# Patient Record
Sex: MALE | Race: WHITE | NOT HISPANIC OR LATINO | Employment: OTHER | RURAL
[De-identification: names, ages, dates, MRNs, and addresses within clinical notes are randomized per-mention and may not be internally consistent; named-entity substitution may affect disease eponyms.]

---

## 2017-03-16 ENCOUNTER — HISTORICAL (OUTPATIENT)
Dept: ADMINISTRATIVE | Facility: HOSPITAL | Age: 77
End: 2017-03-16

## 2017-03-17 LAB
LAB AP CLINICAL INFORMATION: NORMAL
LAB AP COMMENTS: NORMAL
LAB AP DIAGNOSIS - HISTORICAL: NORMAL
LAB AP GROSS PATHOLOGY - HISTORICAL: NORMAL
LAB AP SPECIMEN SUBMITTED - HISTORICAL: NORMAL

## 2017-11-21 ENCOUNTER — HISTORICAL (OUTPATIENT)
Dept: ADMINISTRATIVE | Facility: HOSPITAL | Age: 77
End: 2017-11-21

## 2017-11-22 LAB
LAB AP CLINICAL INFORMATION: NORMAL
LAB AP DIAGNOSIS - HISTORICAL: NORMAL
LAB AP GROSS PATHOLOGY - HISTORICAL: NORMAL
LAB AP SPECIMEN SUBMITTED - HISTORICAL: NORMAL

## 2017-11-28 ENCOUNTER — TELEPHONE (OUTPATIENT)
Dept: NEUROLOGY | Facility: CLINIC | Age: 77
End: 2017-11-28

## 2017-11-28 DIAGNOSIS — M54.16 RIGHT LUMBAR RADICULITIS: Primary | ICD-10-CM

## 2017-11-28 DIAGNOSIS — M79.604 LOWER EXTREMITY PAIN, RIGHT: ICD-10-CM

## 2018-01-08 ENCOUNTER — PROCEDURE VISIT (OUTPATIENT)
Dept: NEUROLOGY | Facility: CLINIC | Age: 78
End: 2018-01-08
Payer: MEDICARE

## 2018-01-08 DIAGNOSIS — M79.604 LOWER EXTREMITY PAIN, RIGHT: ICD-10-CM

## 2018-01-08 DIAGNOSIS — M54.16 RIGHT LUMBAR RADICULITIS: ICD-10-CM

## 2018-01-08 PROCEDURE — 95909 NRV CNDJ TST 5-6 STUDIES: CPT | Mod: PBBFAC | Performed by: PSYCHIATRY & NEUROLOGY

## 2018-01-08 PROCEDURE — 95886 MUSC TEST DONE W/N TEST COMP: CPT | Mod: PBBFAC | Performed by: PSYCHIATRY & NEUROLOGY

## 2018-01-08 PROCEDURE — 95909 NRV CNDJ TST 5-6 STUDIES: CPT | Mod: 26,S$PBB,, | Performed by: PSYCHIATRY & NEUROLOGY

## 2018-01-08 PROCEDURE — 95886 MUSC TEST DONE W/N TEST COMP: CPT | Mod: 26,S$PBB,, | Performed by: PSYCHIATRY & NEUROLOGY

## 2018-02-22 ENCOUNTER — LAB VISIT (OUTPATIENT)
Dept: LAB | Facility: HOSPITAL | Age: 78
End: 2018-02-22
Attending: PSYCHIATRY & NEUROLOGY
Payer: MEDICARE

## 2018-02-22 ENCOUNTER — OFFICE VISIT (OUTPATIENT)
Dept: NEUROLOGY | Facility: CLINIC | Age: 78
End: 2018-02-22
Payer: MEDICARE

## 2018-02-22 VITALS
BODY MASS INDEX: 30.11 KG/M2 | HEIGHT: 70 IN | DIASTOLIC BLOOD PRESSURE: 84 MMHG | WEIGHT: 210.31 LBS | HEART RATE: 68 BPM | SYSTOLIC BLOOD PRESSURE: 157 MMHG

## 2018-02-22 DIAGNOSIS — G60.9 IDIOPATHIC PERIPHERAL NEUROPATHY: Primary | ICD-10-CM

## 2018-02-22 DIAGNOSIS — G60.9 IDIOPATHIC PERIPHERAL NEUROPATHY: ICD-10-CM

## 2018-02-22 LAB
CERULOPLASMIN SERPL-MCNC: 23 MG/DL
TSH SERPL DL<=0.005 MIU/L-ACNC: 1.37 UIU/ML
VIT B12 SERPL-MCNC: 414 PG/ML

## 2018-02-22 PROCEDURE — 82390 ASSAY OF CERULOPLASMIN: CPT

## 2018-02-22 PROCEDURE — 86687 HTLV-I ANTIBODY: CPT

## 2018-02-22 PROCEDURE — 1125F AMNT PAIN NOTED PAIN PRSNT: CPT | Mod: ,,, | Performed by: PSYCHIATRY & NEUROLOGY

## 2018-02-22 PROCEDURE — 36415 COLL VENOUS BLD VENIPUNCTURE: CPT

## 2018-02-22 PROCEDURE — 84425 ASSAY OF VITAMIN B-1: CPT

## 2018-02-22 PROCEDURE — 99999 PR PBB SHADOW E&M-EST. PATIENT-LVL II: CPT | Mod: PBBFAC,,, | Performed by: PSYCHIATRY & NEUROLOGY

## 2018-02-22 PROCEDURE — 99205 OFFICE O/P NEW HI 60 MIN: CPT | Mod: S$PBB,,, | Performed by: PSYCHIATRY & NEUROLOGY

## 2018-02-22 PROCEDURE — 83921 ORGANIC ACID SINGLE QUANT: CPT

## 2018-02-22 PROCEDURE — 84446 ASSAY OF VITAMIN E: CPT

## 2018-02-22 PROCEDURE — 99212 OFFICE O/P EST SF 10 MIN: CPT | Mod: PBBFAC | Performed by: PSYCHIATRY & NEUROLOGY

## 2018-02-22 PROCEDURE — 84443 ASSAY THYROID STIM HORMONE: CPT

## 2018-02-22 PROCEDURE — 1159F MED LIST DOCD IN RCRD: CPT | Mod: ,,, | Performed by: PSYCHIATRY & NEUROLOGY

## 2018-02-22 PROCEDURE — 84207 ASSAY OF VITAMIN B-6: CPT

## 2018-02-22 PROCEDURE — 82525 ASSAY OF COPPER: CPT

## 2018-02-22 PROCEDURE — 82607 VITAMIN B-12: CPT

## 2018-02-22 RX ORDER — BACLOFEN 10 MG/1
10 TABLET ORAL
COMMUNITY
Start: 2018-02-20

## 2018-02-22 RX ORDER — ROSUVASTATIN CALCIUM 5 MG/1
5 TABLET, COATED ORAL
COMMUNITY
Start: 2017-12-05

## 2018-02-22 RX ORDER — OMEPRAZOLE 20 MG/1
20 CAPSULE, DELAYED RELEASE ORAL
COMMUNITY
Start: 2018-01-15

## 2018-02-22 RX ORDER — ERGOCALCIFEROL 1.25 MG/1
50000 CAPSULE ORAL
COMMUNITY
Start: 2018-01-15

## 2018-02-22 RX ORDER — GABAPENTIN 600 MG/1
600 TABLET ORAL
COMMUNITY
Start: 2017-11-16

## 2018-02-22 NOTE — LETTER
February 27, 2018      Sang Giang MD  [address] [place] [address]  [address]  [facility]  [place] [address]           [facility]  [address]  [place] [address]  Phone: [phone]  Fax: [phone]          Patient: Zaira Garcia   MR Number: [id number]   YOB: 1940   Date of Visit: 2/22/2018       Dear Dr. Sang Giang:    Thank you for referring Zaira Garcia to me for evaluation. Attached you will find relevant portions of my assessment and plan of care.    If you have questions, please do not hesitate to call me. I look forward to following Zaira Garcia along with you.    Sincerely,    Devin Amaral MD    Enclosure  CC:  No Recipients    If you would like to receive this communication electronically, please contact [email or web address] or [phone] to request more information on [employer] Link access.    For providers and/or their staff who would like to refer a patient to [facility], please contact us through our one-stop-shop provider referral line, [facility], at [phone].    If you feel you have received this communication in error or would no longer like to receive these types of communications, please e-mail [email or web address]

## 2018-02-22 NOTE — PROGRESS NOTES
Patient Name: Zaira Garcia  MRN: [id number]    CC: PN    HPI: Zaira Garcia is a 77 y.o. male who has had progressive numbness from umbilicus to feet.     Started with left digits 2 and 3 numbness in June of 2015.  Left leg didn't 'feel right', like it was numb. Then spread to right leg.  Balance was affected later - but this too has been progressive. Balance is now poor - falls quite a bit. Fell two years ago and broke his leg.  No facial or RUE symptoms.     He knows when he has to have a BM, but can't feel that he's having it for the last 8-9 mos.   No problems with urination other than frequency. Gets up two-three times at night.     He has chronic LBP. At night, he needs to put a pillow under his back when he lays down. This helps his leg pain.    If standing for too long, he has increasing back pain and LE pain. He may be able to stand for about ten minutes before he has to sit down. If he moves around, he can stand up for a longer period of time.    Was treated with monthly IVIG from May 2016 to Feb 2017 without any improvement.    Was treated with B12 injections and oral, but doesn't recall whether or how low his B12 was.    [facility] was told he had a problem with his back and left CTS. Had surgery on hand and elbow, but still not working well.     Feels like he has to move his legs at night - query RLS.    He has bilateral LE swelling at the end of the day. Gone in the AM.      Review of Systems    Past Medical History  Past Medical History:   Diagnosis Date    GERD (gastroesophageal reflux disease)     Hypercholesterolemia        Medications    Current Outpatient Prescriptions:     baclofen (LIORESAL) 10 MG tablet, 10 mg., Disp: , Rfl:     ergocalciferol (ERGOCALCIFEROL) 50,000 unit Cap, 50,000 Units., Disp: , Rfl:     gabapentin (NEURONTIN) 600 MG tablet, 600 mg., Disp: , Rfl:     omeprazole (PRILOSEC) 20 MG capsule, 20 mg., Disp: , Rfl:     rosuvastatin (CRESTOR) 5 MG tablet, 5 mg., Disp: , Rfl:  "    Allergies  Review of patient's allergies indicates:  Allergies not on file    Social History  Social History     Social History    Marital status:      Spouse name: N/A    Number of children: N/A    Years of education: N/A     Occupational History    Not on file.     Social History Main Topics    Smoking status: Not on file    Smokeless tobacco: Not on file    Alcohol use Not on file    Drug use: Unknown    Sexual activity: Not on file     Other Topics Concern    Not on file     Social History Narrative    No narrative on file       Family History  No family history on file.    Physical Exam  BP (!) 157/84   Pulse 68   Ht 5' 10" (1.778 m)   Wt 95.4 kg (210 lb 5.1 oz)   BMI 30.18 kg/m²     General appearance: Well-developed, well-groomed.     Neurologic Exam: The patient is awake, alert and oriented. Language is fluent. Recent and remote memory are normal. Attention span and concentration are normal. Fund of knowledge is appropriate.     Cranial nerves: pupils are round and reactive to light and accommodation. Visual fields are full to confrontation. Fundoscopic exam reveals sharp discs bilaterally, with good venous pulsations. Ocular motility is full in all cardinal positions of gaze. Facial sensation is normal to pinprick and light touch. Corneal reflexes are present bilaterally. Facial activation is symmetric. Hearing is normal bilaterally. Palate elevates symmetrically and gag reflex is intact bilaterally. Shoulder elevation is symmetric and full strength bilaterally. Tongue is midline and neck rotation strength is normal bilaterally. Neck range of motion is normal.     Motor examination of all extremities demonstrates normal bulk and tone in all four limbs. There are no atrophy or fasciculations. Strength is 5/5 in the upper and lower extremities bilaterally without pronator drift.     Sensory examination: decrement to pinprick, vibration, proprioception in LE to knees.    Deep tendon " reflexes are quiet throughout.     Gait: wide-based gait, unsteady..    Coordination: Finger to nose and heel to shin testing is normal in both upper and lower extremities. Rapid alternating movements are normal in both upper and lower extremities.     General exam  Cardiovascular: regular rate and rhythm with no murmurs, rubs or gallops. There are no carotid or vertebral artery bruits. Pulses in both upper and lower extremities are symmetric. There is no peripheral edema.   Head and neck: no cervical lymphadenopathy        Assessment and Plan    Problem List Items Addressed This Visit     Idiopathic peripheral neuropathy - Primary    Current Assessment & Plan     Progressive gait disturbance with clinical evidence for numbness/ataxia, with normal EMG and normal imaging.          Relevant Orders    TSH (Completed)    HTLV I/II Antibody (Completed)    Ceruloplasmin (Completed)    Copper, serum    Methylmalonic acid, serum    Vitamin B12 (Completed)    Vitamin B1 (Completed)    Vitamin B6 (Completed)    Vitamin B2    VITAMIN E (Completed)                  Raul Amaral MD, HOOD, FAAN  Department of Neurology  [facility] [place] [address]

## 2018-02-23 LAB — HTLV I+II AB SER QL IA: NEGATIVE

## 2018-02-25 LAB — A-TOCOPHEROL VIT E SERPL-MCNC: 907 UG/DL (ref 500–1800)

## 2018-02-26 LAB — VIT B1 SERPL-MCNC: 58 UG/L (ref 38–122)

## 2018-02-27 PROBLEM — G60.9 IDIOPATHIC PERIPHERAL NEUROPATHY: Status: ACTIVE | Noted: 2018-02-27

## 2018-02-27 LAB
COPPER SERPL-MCNC: 927 UG/L (ref 665–1480)
METHYLMALONATE SERPL-SCNC: 0.44 UMOL/L
PYRIDOXAL SERPL-MCNC: 14 UG/L (ref 5–50)
VIT B2 SERPL-MCNC: 8 MCG/L (ref 1–19)

## 2018-02-27 NOTE — ASSESSMENT & PLAN NOTE
Progressive gait disturbance with clinical evidence for numbness/ataxia, with normal EMG and normal imaging.

## 2018-04-23 ENCOUNTER — TELEPHONE (OUTPATIENT)
Dept: NEUROLOGY | Facility: CLINIC | Age: 78
End: 2018-04-23

## 2018-04-23 NOTE — TELEPHONE ENCOUNTER
----- Message from Paula Mckenna sent at 4/20/2018  9:56 AM CDT -----  Contact: Rashida (daughter) @ [phone] or   Calling to speak with Dr. Amaral in regards to the lab results and scheduling an appt sooner than 6/28 (earliest in the system). Please call.

## 2018-04-23 NOTE — TELEPHONE ENCOUNTER
RN contacted patient and scheduled appt for 5/29. Patient's daughter, Rashida, would like to know if lab results and POC.     [email or web address]

## 2018-05-29 ENCOUNTER — OFFICE VISIT (OUTPATIENT)
Dept: NEUROLOGY | Facility: CLINIC | Age: 78
End: 2018-05-29
Payer: MEDICARE

## 2018-05-29 ENCOUNTER — HOSPITAL ENCOUNTER (OUTPATIENT)
Dept: RADIOLOGY | Facility: HOSPITAL | Age: 78
Discharge: HOME | End: 2018-05-29
Attending: PSYCHIATRY & NEUROLOGY
Payer: MEDICARE

## 2018-05-29 VITALS
WEIGHT: 213.38 LBS | HEART RATE: 65 BPM | SYSTOLIC BLOOD PRESSURE: 168 MMHG | DIASTOLIC BLOOD PRESSURE: 83 MMHG | BODY MASS INDEX: 30.55 KG/M2 | HEIGHT: 70 IN

## 2018-05-29 DIAGNOSIS — M54.50 LOW BACK PAIN, NON-SPECIFIC: ICD-10-CM

## 2018-05-29 DIAGNOSIS — G60.9 IDIOPATHIC PERIPHERAL NEUROPATHY: ICD-10-CM

## 2018-05-29 DIAGNOSIS — R27.0 ATAXIA: Primary | ICD-10-CM

## 2018-05-29 DIAGNOSIS — R27.0 ATAXIA: ICD-10-CM

## 2018-05-29 DIAGNOSIS — R15.2 FECAL URGENCY: ICD-10-CM

## 2018-05-29 LAB
CREAT SERPL-MCNC: 1 MG/DL (ref 0.5–1.4)
SAMPLE: NORMAL

## 2018-05-29 PROCEDURE — 99214 OFFICE O/P EST MOD 30 MIN: CPT | Mod: S$PBB,,, | Performed by: PSYCHIATRY & NEUROLOGY

## 2018-05-29 PROCEDURE — 70553 MRI BRAIN STEM W/O & W/DYE: CPT | Mod: TC

## 2018-05-29 PROCEDURE — 72148 MRI LUMBAR SPINE W/O DYE: CPT | Mod: TC

## 2018-05-29 PROCEDURE — 72148 MRI LUMBAR SPINE W/O DYE: CPT | Mod: 26,,, | Performed by: RADIOLOGY

## 2018-05-29 PROCEDURE — 99999 PR PBB SHADOW E&M-EST. PATIENT-LVL III: CPT | Mod: PBBFAC,,, | Performed by: PSYCHIATRY & NEUROLOGY

## 2018-05-29 PROCEDURE — 99213 OFFICE O/P EST LOW 20 MIN: CPT | Mod: PBBFAC,25 | Performed by: PSYCHIATRY & NEUROLOGY

## 2018-05-29 PROCEDURE — 70553 MRI BRAIN STEM W/O & W/DYE: CPT | Mod: 26,,, | Performed by: RADIOLOGY

## 2018-05-29 PROCEDURE — 25500020 PHARM REV CODE 255: Performed by: PSYCHIATRY & NEUROLOGY

## 2018-05-29 PROCEDURE — A9585 GADOBUTROL INJECTION: HCPCS | Performed by: PSYCHIATRY & NEUROLOGY

## 2018-05-29 RX ORDER — GADOBUTROL 604.72 MG/ML
10 INJECTION INTRAVENOUS
Status: COMPLETED | OUTPATIENT
Start: 2018-05-29 | End: 2018-05-29

## 2018-05-29 RX ADMIN — GADOBUTROL 10 ML: 604.72 INJECTION INTRAVENOUS at 12:05

## 2018-05-29 NOTE — PROGRESS NOTES
Patient Name: Zaira Garcia  MRN: [id number]    CC: PN    Interval history 5/30/18  No major changes, for better or worse, but does not that he knows when he has to have a bowel movement, but does not feel it when he is having one. No numbness when cleaning after BM.     HPI: Zaira Garcia is a 77 y.o. male who has had progressive numbness from umbilicus to feet.     Started with left digits 2 and 3 numbness in June of 2015.  Left leg didn't 'feel right', like it was numb. Then spread to right leg.  Balance was affected later - but this too has been progressive. Balance is now poor - falls quite a bit. Fell two years ago and broke his leg.  No facial or RUE symptoms.     He knows when he has to have a BM, but can't feel that he's having it for the last 8-9 mos.   No problems with urination other than frequency. Gets up two-three times at night.     He has chronic LBP. At night, he needs to put a pillow under his back when he lays down. This helps his leg pain.    If standing for too long, he has increasing back pain and LE pain. He may be able to stand for about ten minutes before he has to sit down. If he moves around, he can stand up for a longer period of time.    Was treated with monthly IVIG from May 2016 to Feb 2017 without any improvement.    Was treated with B12 injections and oral, but doesn't recall whether or how low his B12 was.    [facility] was told he had a problem with his back and left CTS. Had surgery on hand and elbow, but still not working well.     Feels like he has to move his legs at night - query RLS.    He has bilateral LE swelling at the end of the day. Gone in the AM.      Review of Systems    Past Medical History  Past Medical History:   Diagnosis Date    GERD (gastroesophageal reflux disease)     Hypercholesterolemia        Medications    Current Outpatient Prescriptions:     baclofen (LIORESAL) 10 MG tablet, 10 mg., Disp: , Rfl:     ergocalciferol (ERGOCALCIFEROL) 50,000 unit Cap, 50,000  "Units., Disp: , Rfl:     gabapentin (NEURONTIN) 600 MG tablet, 600 mg., Disp: , Rfl:     omeprazole (PRILOSEC) 20 MG capsule, 20 mg., Disp: , Rfl:     rosuvastatin (CRESTOR) 5 MG tablet, 5 mg., Disp: , Rfl:   No current facility-administered medications for this visit.     Allergies  Review of patient's allergies indicates:  Allergies not on file    Social History  Social History     Social History    Marital status:      Spouse name: N/A    Number of children: N/A    Years of education: N/A     Occupational History    Not on file.     Social History Main Topics    Smoking status: Former Smoker    Smokeless tobacco: Never Used    Alcohol use Not on file    Drug use: Unknown    Sexual activity: Not on file     Other Topics Concern    Not on file     Social History Narrative    No narrative on file       Family History  No family history on file.    Physical Exam  BP (!) 168/83   Pulse 65   Ht 5' 10" (1.778 m)   Wt 96.8 kg (213 lb 6.5 oz)   BMI 30.62 kg/m²     General appearance: Well-developed, well-groomed.     Neurologic Exam: The patient is awake, alert and oriented. Language is fluent. Recent and remote memory are normal. Attention span and concentration are normal. Fund of knowledge is appropriate.     Cranial nerves: pupils are round and reactive to light and accommodation. Visual fields are full to confrontation. Fundoscopic exam reveals sharp discs bilaterally, with good venous pulsations. Ocular motility is full in all cardinal positions of gaze. Facial sensation is normal to pinprick and light touch. Corneal reflexes are present bilaterally. Facial activation is symmetric. Hearing is normal bilaterally. Palate elevates symmetrically and gag reflex is intact bilaterally. Shoulder elevation is symmetric and full strength bilaterally. Tongue is midline and neck rotation strength is normal bilaterally. Neck range of motion is normal.     Motor examination of all extremities demonstrates " normal bulk and tone in all four limbs. There are no atrophy or fasciculations. Strength is 5/5 in the upper and lower extremities bilaterally without pronator drift.     Sensory examination: decrement to pinprick, vibration, proprioception in LE to knees.     Deep tendon reflexes are quiet throughout.     Gait: wide-based gait, unsteady.     Coordination: Finger to nose is normal in both upper extremities. Rapid alternating movements are normal in both upper extremities. He is has bilateral LE discoordination, even with eyes open, decreased GIRISH.    General exam  Cardiovascular: regular rate and rhythm with no murmurs, rubs or gallops. There are no carotid or vertebral artery bruits. Pulses in both upper and lower extremities are symmetric. There is no peripheral edema.   Head and neck: no cervical lymphadenopathy    Labs  Results for LOUISE ALLEN (MRN [id number]) as of 5/29/2018 06:55   Ref. Range 2/22/2018 12:10   Vitamin B-12 Latest Ref Range: 210 - 950 pg/mL 414   Methlymalonic Acid Latest Ref Range: <0.40 umol/L 0.44 (H)   Thiamine Latest Ref Range: 38 - 122 ug/L 58   Vitamin B2 Latest Ref Range: 1 - 19 mcg/L 8   Vitamin B6 Latest Ref Range: 5 - 50 ug/L 14   Vitamin E Latest Ref Range: 500 - 1800 ug/dL 907   TSH Latest Ref Range: 0.400 - 4.000 uIU/mL 1.366   HTLV I/II Ab Unknown Negative   Ceruloplasmin Latest Ref Range: 15.0 - 45.0 mg/dL 23.0   Copper Latest Ref Range: 665 - 1480 ug/L 927     EMG 1/8/18: essentially normal except for enlarged MUPs in the left TA    MRI Brain 5/29/18:   Ventricles and sulci are normal in size for age without evidence of hydrocephalus. No extra-axial blood or fluid collections.    The brain parenchyma appears within normal limits.  Few scattered punctate foci of T2/FLAIR signal hyperintensity throughout the supratentorial white matter which are nonspecific and considered within normal limits for age.  No mass lesion, acute hemorrhage, edema or acute infarct. No abnormal  enhancement.    Normal vascular flow voids are preserved.    Skull/extracranial contents (limited evaluation): Bone marrow signal intensity is normal.  Small left maxillary antral mucous retention cyst.    MRI LS spine 5/29/18    Vertebral body heights and alignment are maintained.  No concerning marrow signal identified.    There is a 4 x 2 x 8 mm intradural enhancing lesion at the L2 level involving causing mild splaying and mass effect of the cauda equina nerve roots    Multilevel disc desiccation present with height loss at L4-5.    Conus terminates normally.  Intra-abdominal/pelvic structures are unremarkable.    Paraspinal muscles & soft tissues: Unremarkable.    T12-L1: Unremarkable    L1-L2: No spinal canal stenosis or neural foraminal narrowing.    L2-L3: No spinal canal stenosis or neural foraminal narrowing.  Facet degenerative hypertrophy present.    L3-L4: No spinal canal stenosis or neural foraminal narrowing.  Facet degenerative hypertrophy present.    L4-L5: Tiny central disc protrusion present with annular fissure.  Facet degenerative hypertrophy present.  No spinal canal stenosis.  Mild left neural foraminal narrowing.    L5-S1: No spinal canal stenosis or neural foraminal narrowing.    Summary of previous workup    MRI Lumbar Spine w/wo 1/9/17  Stable small intradural mass at L2, query schwannoma, intradural meningioma, endymoma. Left NFS at L4 d/t disc. Heterogenous marrow signal intensity for which marrow infiltrative disorders or myeloma cannot be excluded.    MRI Tspine w/wo 11/26/16  t1 marrow signal heterogeneity    Lumbar myelogram 4/6/16  Small intradural mass at L2, o/w nl    CT Abd/Pelv 1/26/16  Fatty infiltration of liver. Fatty replacement of liver. Diverticulosis of colon    MRI Brain w/w/o, 1/20/16  Small vessel wm disease    MRI Lumbar spine 7/29/2015  Small enhancing intradural extramedullary lesion at L2    NM Scan 7/22/2015  Symmetric uptake in major joints and spine; Increased  isotopic activity in the right fifth rib, query fracture.     MRI lumbar spine 7/22/15  Small area of nodularity at the L1-L2 level; heterogenous marrow signal, but several tiny sclerotic appearing areas now visible within marrow space; non-specific.     MRI cspine 7/22/15  DJD causing mild cord flattening and canal narrowing    Assessment and Plan    Problem List Items Addressed This Visit     Ataxia - Primary    Current Assessment & Plan     Progressive ataxia and sensory loss with normal EMG. Difficult case.   Query paraneoplastic syndrome - will check today  Query degenerative/genetic condition - will repeat brain imaging and LS spine imaging today         Relevant Orders    Paraneoplastic Autoantibody Evaulation, Serum    MRI Brain W WO Contrast (Completed)    MRI Lumbar Spine Without Contrast (Completed)    Creatinine, serum (Completed)    Fatty acids, long chain    MRI lumbar spine with contrast    Idiopathic peripheral neuropathy    Relevant Orders    Paraneoplastic Autoantibody Evaulation, Serum    MRI Brain W WO Contrast (Completed)    MRI Lumbar Spine Without Contrast (Completed)    Creatinine, serum (Completed)    MRI lumbar spine with contrast    Fecal urgency    Current Assessment & Plan     Relatively new symptom. Will repeat LS spine imaging         Low back pain, non-specific    Relevant Orders    Paraneoplastic Autoantibody Evaulation, Serum    MRI Brain W WO Contrast (Completed)    MRI Lumbar Spine Without Contrast (Completed)    Creatinine, serum (Completed)    MRI lumbar spine with contrast                  Raul Amaral MD, HOOD, FAAN  Department of Neurology  [facility] [place] [address]

## 2018-05-30 PROBLEM — R15.2 FECAL URGENCY: Status: ACTIVE | Noted: 2018-05-30

## 2018-05-30 PROBLEM — M54.50 LOW BACK PAIN, NON-SPECIFIC: Status: ACTIVE | Noted: 2018-05-30

## 2018-05-30 PROBLEM — R27.0 ATAXIA: Status: ACTIVE | Noted: 2018-05-30

## 2018-07-10 ENCOUNTER — TELEPHONE (OUTPATIENT)
Dept: NEUROLOGY | Facility: CLINIC | Age: 78
End: 2018-07-10

## 2018-07-10 NOTE — TELEPHONE ENCOUNTER
----- Message from Kandace Grace sent at 7/10/2018 12:41 PM CDT -----  Contact: Ms Patino [phone]  Patient calling for results of MRI.   Please call pt at [phone]

## 2018-07-27 ENCOUNTER — TELEPHONE (OUTPATIENT)
Dept: NEUROLOGY | Facility: CLINIC | Age: 78
End: 2018-07-27

## 2018-07-27 NOTE — TELEPHONE ENCOUNTER
----- Message from Saleem Willett sent at 7/27/2018  3:17 PM CDT -----  Contact: Ines (Daughter) [phone]  Needs Advice    Reason for call:   The patient would like to speak to someone regarding his test results    Communication Preference:PHONE     Additional Information:

## 2018-08-06 DIAGNOSIS — R27.0 ATAXIA: Primary | ICD-10-CM

## 2018-08-06 DIAGNOSIS — G60.9 IDIOPATHIC PERIPHERAL NEUROPATHY: ICD-10-CM

## 2018-08-09 ENCOUNTER — TELEPHONE (OUTPATIENT)
Dept: NEUROLOGY | Facility: CLINIC | Age: 78
End: 2018-08-09

## 2018-08-09 NOTE — TELEPHONE ENCOUNTER
RN spoke with patient's daughter regarding MRI disc that are needed for upcoming appt with [facility] in [place] at the end of August.(Referral was faxed on 8/6/18 per Dr. Amaral's request).  Rashida was unsure of the appt date. RN provided Rashida with Health Information number [phone]. Additional records sent to [facility] (Ph:[phone] Fax [phone]).

## 2022-01-21 DIAGNOSIS — R29.898 LEG WEAKNESS, BILATERAL: ICD-10-CM

## 2022-01-21 DIAGNOSIS — G37.3 TRANSVERSE MYELITIS: Primary | ICD-10-CM

## 2022-01-28 ENCOUNTER — CLINICAL SUPPORT (OUTPATIENT)
Dept: REHABILITATION | Facility: HOSPITAL | Age: 82
End: 2022-01-28
Payer: MEDICARE

## 2022-01-28 DIAGNOSIS — R29.898 LEG WEAKNESS, BILATERAL: ICD-10-CM

## 2022-01-28 DIAGNOSIS — G37.3 TRANSVERSE MYELITIS: ICD-10-CM

## 2022-01-28 PROCEDURE — 97162 PT EVAL MOD COMPLEX 30 MIN: CPT

## 2022-01-28 NOTE — PLAN OF CARE
[facility]  Physical Therapy Initial Evaluation    Name: Zaira Garcia  Clinic Number: [id number]    Therapy Diagnosis:   Encounter Diagnoses   Name Primary?    Leg weakness, bilateral     Transverse myelitis      Physician: Ellie Dominguez II, *    Physician Orders: PT Eval and Treat  Medical Diagnosis from Referral: transverse myelitis  Evaluation Date: 1/28/2022  Authorization Period Expiration: 3/11/22  Plan of Care Expiration: 1/28/2022 to 3/11/22  Updated Plan of Care Due: 2/18/22  Visit # / Visits authorized: 1/13    Time In: 0845  Time Out: 0930  Total Billable Time: 45 minutes    Precautions: Standard and Fall    Subjective     Date of onset: 5 years of neuropathy    History of current condition - Rolando reports: he has had neuropathy for 5 years.  CVA 2 years ago.  He is having difficulty with functional mobility and has frequent falls with one as recently as yesterday.  He broke his leg due to a fall 3 years ago.  He had a back surgery due to a back injury from a fall.  He estimates 2 falls per month.       Medical History:   Past Medical History:   Diagnosis Date    GERD (gastroesophageal reflux disease)     Hypercholesterolemia        Surgical History:   Zaira Garcia  has a past surgical history that includes Carpal tunnel release (Left).    Medications:   Zaira has a current medication list which includes the following prescription(s): baclofen, ergocalciferol, gabapentin, omeprazole, and rosuvastatin.    Allergies:   Review of patient's allergies indicates:  No Known Allergies     Imaging: none:     Prior Therapy: several bouts of therapy with most recent home health approximately a year ago.   Prior Level of Function: independent  Current Level of Function: amb with RW and frequent falls  History of Falls: yes  Social History: patient; lives with their spouse; House  Steps/Stairs: 1 step  Occupation: retired  Driving: No, but was prior to onset of  condition  Durable Medical Equipment: RW, Rollator, lift chair, grab bars in shower, 3 wheel POV and 4 wheel POV.  Dominant Extremity: right  Affected Extremity: both    Pain:  Current 0/10, worst 10/10, best 0/10   Location: bilateral back , lower legs and upper legs  Description: Aching, Sharp and Shooting  Aggravating Factors: Standing and Walking  Easing Factors: rest    Pts goals: improve strength and balance    Objective     Range of Motion/Strength:   Bilateral UE and LE's WFL for ROM      U/E MMT Right Left Pain/Dysfunction with Movement   Shoulder Flexion 4/5 4/5    Shoulder Extension 4/5 4/5    Shoulder Abduction 4/5 4/5    Shoulder Adduction 4/5 4/5    Shoulder IR 4/5 4/5    Shoulder ER  @ 0* Abduction 4/5 4/5    Shoulder ER  @ 90* Abduction 4/5 4/5    Elbow Flexion  4/5 4/5    Elbow Extension 4/5 4/5    Rhomboids 4/5 4/5    Mid Traps 4/5 4/5    Low Traps 4/5 4/5        L/E MMT Right Left Pain/Dysfunction with Movement   Hip Flexion 3+/5 3+/5    Hip Abduction 3+/5 3+/5    Hip Adduction 3+/5 3+/5    Knee Flexion 3+/5 3+/5    Knee Extension 3+/5 3+/5    Ankle DF 4-/5 4-/5    Ankle PF 4-/5 4-/5      Posture: forward flexed posture in standing  Palpation: insignificant  Sensation: bilateral lower legs are numb.  Mild numbness in thighs bilaterally  DTRs: NT    Special Tests: sit to stand very labored and with UE assistance, and stand to sit very unsafe and with little to no eccentric control     Gait Analysis: patient ambulated with RW and extremely wide ZEN with flat foot initial contact, ataxic movement with gait and overall unsteady gait.     TU.2 seconds     Balance: Tinetti     Transfers: labored with bilateral hands and RW with unsteady sit to stand and very uncontrolled stand to sit.     Other:     CMS Impairment/Limitation/Restriction for FOTO Intake Survey    Therapist reviewed FOTO scores for Zaira Garcia on 2022.   FOTO documents entered into EPIC - see Media  section.    Limitation Score: 39%  Category: Mobility     TREATMENT     Home Exercises and Patient Education Provided    Education provided: eval results, Plan of care, and HEP    Written Home Exercises Provided: yes.  Exercises were reviewed and Rolando was able to demonstrate them prior to the end of the session.  Rolando demonstrated fair  understanding of the education provided.     See EMR under below for exercises provided 1/28/2022.      Assessment     Zaira is a 81 y.o. male referred to outpatient physical therapy with a medical diagnosis of transverse myelitis per MD order. Pt presents to PT LE weakness, poor gait, poor balance, difficulty with mobility .    Pt prognosis is Fair.   Pt will benefit from skilled outpatient Physical Therapy to address the deficits stated above and in the chart below, provide pt/family education, and to maximize pt's level of independence.     Plan of care discussed with patient: Yes  Pt's spiritual, cultural and educational needs considered and pt agreeable to plan of care and goals as stated below:     Anticipated Barriers for therapy: long history of progressive neuropathy    Decision Making/ Complexity Score: moderate    Goals:    Short Term Goals:  1. Patient will demonstrate independence with home exercise program to ensure carryover of treatment.  2. Patient will perform sit to/from stand with supervision to improve independence and safety with transfers.  3. Patient will improve bilateral lower extremity strength to 4-/5 to improve independence and safety with bed mobility, transfers, and gait.  4. Patient will improve Tinetti Balance + Gait score to 12/28 to improve dynamic standing balance and lower fall risk.   5. Patient will ambulate 250 feet with a rolling walker with supervision to improve independence and safety with gait.     Long Term Goals:  1. Patient will perform sit to/from stand with modified independence to improve independence and safety with  transfers.  2. Patient will improve bilateral lower extremity strength to 4/5 to improve independence and safety with bed mobility, transfers, and gait.  3. Patient will improve Tinetti Balance + Gait score to 14/28 to improve dynamic standing balance and lower fall risk.   4. Patient will ambulate 350 feet with a rolling walker with modified independence including up/down curbs and ramps to improve independence and safety with community ambulation.    Plan     Plan of care Certification: 1/28/2022 to 3/11/22.    Outpatient Physical Therapy 2 times weekly for 6 weeks to include the following interventions: Gait Training, Neuromuscular Re-ed, Patient Education, Therapeutic Activities and Therapeutic Exercise.     EMANUEL DOMINGUEZ, PT, ATP  1/28/2022      I CERTIFY THE NEED FOR THESE SERVICES FURNISHED UNDER THIS PLAN OF TREATMENT AND WHILE UNDER MY CARE.    Physician's comments:      Physician's Signature: _________________________________________  Date: __________________________

## 2022-02-01 ENCOUNTER — CLINICAL SUPPORT (OUTPATIENT)
Dept: REHABILITATION | Facility: HOSPITAL | Age: 82
End: 2022-02-01
Payer: MEDICARE

## 2022-02-01 DIAGNOSIS — R29.898 LEG WEAKNESS, BILATERAL: Primary | ICD-10-CM

## 2022-02-01 DIAGNOSIS — M54.50 LOW BACK PAIN, NON-SPECIFIC: ICD-10-CM

## 2022-02-01 PROCEDURE — 97110 THERAPEUTIC EXERCISES: CPT | Mod: CQ

## 2022-02-01 NOTE — PROGRESS NOTES
"  Physical Therapy Treatment Note     Name: Zaira Garcia  Clinic Number: [id number]    Therapy Diagnosis: No diagnosis found.  Physician: Ellie Dominguez II, *    Visit Date: 2/1/2022    Physician Orders: PT Eval and Treat  Medical Diagnosis from Referral: transverse myelitis  Evaluation Date: 1/28/2022  Authorization Period Expiration: 3/11/22  Plan of Care Expiration: 1/28/2022 to 3/11/22  Updated Plan of Care Due: 2/18/22  Visit # / Visits authorized: 2/13  PTA Visit #: 1    Time In: 0846  Time Out: 0915  Total Billable Time: 29 minutes    Precautions: Standard and Fall    Subjective     Pt reports: pain with walking this morning that went away the more he moved around.  He was compliant with home exercise program.  Response to previous treatment: none yet  Functional change: none yet    Pain: 0/10  Location: bilateral back, lower legs and upper legs    Objective     Rolando received therapeutic exercises to develop strength and flexibility for 29 minutes including:  NuStep x 8 min  SLR 2 x 10  LAQ 2 x 10  Seated hip abduction with red band 2 x 10  Seated marching with red band 2 x 10  Hip adduction with ball squeezes 2 x 10  2" step-ups 2 x 10  Sit to stands 2 x 10    Rolando participated in gait training to improve functional mobility and safety for 0  minutes, including:  Not this visit    Home Exercises Provided and Patient Education Provided     Education provided: HEP provided on eval    Written Home Exercises Provided: Patient instructed to cont prior HEP.  Exercises were reviewed and Rolando was able to demonstrate them prior to the end of the session.  Rolando demonstrated fair  understanding of the education provided.     See EMR under Patient Instructions for exercises provided 1/28/2022.    Assessment     Patient able to perform all exercises without increase in pain. Patient stated he was tired after tx, but felt good.    Rolando Is progressing well towards his goals.   Pt prognosis is Fair.     Pt " will continue to benefit from skilled outpatient physical therapy to address the deficits listed in the problem list box on initial evaluation, provide pt/family education and to maximize pt's level of independence in the home and community environment.     Pt's spiritual, cultural and educational needs considered and pt agreeable to plan of care and goals.     Anticipated barriers to physical therapy: long history of progressive neuropathy.    Goals:     Short Term Goals:  1. Patient will demonstrate independence with home exercise program to ensure carryover of treatment.  2. Patient will perform sit to/from stand with supervision to improve independence and safety with transfers.  3. Patient will improve bilateral lower extremity strength to 4-/5 to improve independence and safety with bed mobility, transfers, and gait.  4. Patient will improve Tinetti Balance + Gait score to 12/28 to improve dynamic standing balance and lower fall risk.   5. Patient will ambulate 250 feet with a rolling walker with supervision to improve independence and safety with gait.      Long Term Goals:  1. Patient will perform sit to/from stand with modified independence to improve independence and safety with transfers.  2. Patient will improve bilateral lower extremity strength to 4/5 to improve independence and safety with bed mobility, transfers, and gait.  3. Patient will improve Tinetti Balance + Gait score to 14/28 to improve dynamic standing balance and lower fall risk.   4. Patient will ambulate 350 feet with a rolling walker with modified independence including up/down curbs and ramps to improve independence and safety with community ambulation.      Plan     Will continue with POC as appropriate.    Ronak Otoole, PTA  2/1/2022

## 2022-02-03 ENCOUNTER — CLINICAL SUPPORT (OUTPATIENT)
Dept: REHABILITATION | Facility: HOSPITAL | Age: 82
End: 2022-02-03
Payer: MEDICARE

## 2022-02-03 DIAGNOSIS — M54.50 LOW BACK PAIN, NON-SPECIFIC: ICD-10-CM

## 2022-02-03 DIAGNOSIS — R29.898 LEG WEAKNESS, BILATERAL: Primary | ICD-10-CM

## 2022-02-03 PROCEDURE — 97110 THERAPEUTIC EXERCISES: CPT | Mod: CQ

## 2022-02-03 NOTE — PROGRESS NOTES
"  Physical Therapy Treatment Note     Name: Zaira Garcia  Clinic Number: [id number]    Therapy Diagnosis: No diagnosis found.  Physician: Ellie Dominguez II, *    Visit Date: 2/3/2022    Physician Orders: PT Eval and Treat  Medical Diagnosis from Referral: transverse myelitis  Evaluation Date: 1/28/2022  Authorization Period Expiration: 3/11/22  Plan of Care Expiration: 1/28/2022 to 3/11/22  Updated Plan of Care Due: 2/18/22  Visit # / Visits authorized: 3/13  PTA Visit #: 2    Time In: 0845  Time Out: 0910  Total Billable Time: 25 minutes    Precautions: Standard and Fall    Subjective     Pt reports: no pain this morning  He was compliant with home exercise program.  Response to previous treatment: none yet  Functional change: none yet    Pain: 0/10  Location: bilateral back, lower legs and upper legs    Objective     Rolando received therapeutic exercises to develop strength and flexibility for 25 minutes including:  · NuStep x 8 min  · SLR 2 x 10  · LAQ 2 x 10  · Seated hip abduction with red band 2 x 10  · Seated marching with red band 2 x 10  · Hip adduction with ball squeezes 2 x 10  · 2" step-ups 2 x 10  · Sit to stands 2 x 10    Rolando participated in gait training to improve functional mobility and safety for 0  minutes, including:  Not this visit    Home Exercises Provided and Patient Education Provided     Education provided: HEP provided on eval    Written Home Exercises Provided: Patient instructed to cont prior HEP.  Exercises were reviewed and Rolando was able to demonstrate them prior to the end of the session.  Rolando demonstrated fair  understanding of the education provided.     See EMR under Patient Instructions for exercises provided 1/28/2022.    Assessment     Patient able to perform all exercises without increase in pain. Patient stated he was tired after tx, but felt fine.    Rolando Is progressing well towards his goals.   Pt prognosis is Fair.     Pt will continue to benefit from skilled " outpatient physical therapy to address the deficits listed in the problem list box on initial evaluation, provide pt/family education and to maximize pt's level of independence in the home and community environment.     Pt's spiritual, cultural and educational needs considered and pt agreeable to plan of care and goals.     Anticipated barriers to physical therapy: long history of progressive neuropathy.    Goals:     Short Term Goals:  1. Patient will demonstrate independence with home exercise program to ensure carryover of treatment.  2. Patient will perform sit to/from stand with supervision to improve independence and safety with transfers.  3. Patient will improve bilateral lower extremity strength to 4-/5 to improve independence and safety with bed mobility, transfers, and gait.  4. Patient will improve Tinetti Balance + Gait score to 12/28 to improve dynamic standing balance and lower fall risk.   5. Patient will ambulate 250 feet with a rolling walker with supervision to improve independence and safety with gait.      Long Term Goals:  1. Patient will perform sit to/from stand with modified independence to improve independence and safety with transfers.  2. Patient will improve bilateral lower extremity strength to 4/5 to improve independence and safety with bed mobility, transfers, and gait.  3. Patient will improve Tinetti Balance + Gait score to 14/28 to improve dynamic standing balance and lower fall risk.   4. Patient will ambulate 350 feet with a rolling walker with modified independence including up/down curbs and ramps to improve independence and safety with community ambulation.      Plan     Will continue with POC as appropriate.    Ronak Otoole, ARNULFO  2/3/2022

## 2022-02-08 ENCOUNTER — CLINICAL SUPPORT (OUTPATIENT)
Dept: REHABILITATION | Facility: HOSPITAL | Age: 82
End: 2022-02-08
Payer: MEDICARE

## 2022-02-08 DIAGNOSIS — M54.50 LOW BACK PAIN, NON-SPECIFIC: Primary | ICD-10-CM

## 2022-02-08 DIAGNOSIS — R29.898 LEG WEAKNESS, BILATERAL: ICD-10-CM

## 2022-02-08 PROCEDURE — 97110 THERAPEUTIC EXERCISES: CPT | Mod: CQ

## 2022-02-08 NOTE — PROGRESS NOTES
"  Physical Therapy Treatment Note     Name: Zaira Garcia  Clinic Number: [id number]    Therapy Diagnosis: No diagnosis found.  Physician: Ellie Dominguez II, *    Visit Date: 2/8/2022    Physician Orders: PT Eval and Treat  Medical Diagnosis from Referral: transverse myelitis  Evaluation Date: 1/28/2022  Authorization Period Expiration: 3/11/22  Plan of Care Expiration: 1/28/2022 to 3/11/22  Updated Plan of Care Due: 2/18/22  Visit # / Visits authorized: 4/13  PTA Visit #: 3    Time In: 0839  Time Out: 0906  Total Billable Time: 27 minutes    Precautions: Standard and Fall    Subjective     Pt reports: "just stiff this morning."  He was compliant with home exercise program.  Response to previous treatment: none yet  Functional change: none yet    Pain: 0/10  Location: bilateral back, lower legs and upper legs    Objective     Rolando received therapeutic exercises to develop strength and flexibility for 27 minutes including:  · NuStep x 8 min  · SLR 2 x 10  · Heel slides 2 x 10  · Bridges 2 x 10  · LAQ 2 x 10 with 1.5 lb strap weight  · Seated hip abduction with red band x 25  · Seated marching with red band x 25  · Hip adduction with ball squeezes x 25  · 2" step-ups 2 x 10  · Sit to stands 2 x 10    Rolando participated in gait training to improve functional mobility and safety for 0  minutes, including:  Not this visit    Home Exercises Provided and Patient Education Provided     Education provided: HEP provided on eval    Written Home Exercises Provided: Patient instructed to cont prior HEP.  Exercises were reviewed and Rolando was able to demonstrate them prior to the end of the session.  Rolando demonstrated fair  understanding of the education provided.     See EMR under Patient Instructions for exercises provided 1/28/2022.    Assessment     Patient able to perform all exercises without increase in pain. Patient stated he felt good after treatment.    Rolando Is progressing well towards his goals.   Pt " prognosis is Fair.     Pt will continue to benefit from skilled outpatient physical therapy to address the deficits listed in the problem list box on initial evaluation, provide pt/family education and to maximize pt's level of independence in the home and community environment.     Pt's spiritual, cultural and educational needs considered and pt agreeable to plan of care and goals.     Anticipated barriers to physical therapy: long history of progressive neuropathy.    Goals:     Short Term Goals:  1. Patient will demonstrate independence with home exercise program to ensure carryover of treatment.  2. Patient will perform sit to/from stand with supervision to improve independence and safety with transfers.  3. Patient will improve bilateral lower extremity strength to 4-/5 to improve independence and safety with bed mobility, transfers, and gait.  4. Patient will improve Tinetti Balance + Gait score to 12/28 to improve dynamic standing balance and lower fall risk.   5. Patient will ambulate 250 feet with a rolling walker with supervision to improve independence and safety with gait.      Long Term Goals:  1. Patient will perform sit to/from stand with modified independence to improve independence and safety with transfers.  2. Patient will improve bilateral lower extremity strength to 4/5 to improve independence and safety with bed mobility, transfers, and gait.  3. Patient will improve Tinetti Balance + Gait score to 14/28 to improve dynamic standing balance and lower fall risk.   4. Patient will ambulate 350 feet with a rolling walker with modified independence including up/down curbs and ramps to improve independence and safety with community ambulation.      Plan     Will continue with POC as appropriate.    Ronak Otoole, PTA  2/8/2022

## 2022-02-09 ENCOUNTER — TELEPHONE (OUTPATIENT)
Dept: EMERGENCY MEDICINE | Facility: HOSPITAL | Age: 82
End: 2022-02-09
Payer: MEDICARE

## 2022-02-10 ENCOUNTER — CLINICAL SUPPORT (OUTPATIENT)
Dept: REHABILITATION | Facility: HOSPITAL | Age: 82
End: 2022-02-10
Payer: MEDICARE

## 2022-02-10 DIAGNOSIS — M54.50 LOW BACK PAIN, NON-SPECIFIC: Primary | ICD-10-CM

## 2022-02-10 DIAGNOSIS — R29.898 LEG WEAKNESS, BILATERAL: ICD-10-CM

## 2022-02-10 PROCEDURE — 97110 THERAPEUTIC EXERCISES: CPT | Mod: CQ

## 2022-02-10 NOTE — PROGRESS NOTES
"  Physical Therapy Treatment Note     Name: Zaira Garcia  Clinic Number: [id number]    Therapy Diagnosis: No diagnosis found.  Physician: Ellie Dominguez II, *    Visit Date: 2/10/2022    Physician Orders: PT Eval and Treat  Medical Diagnosis from Referral: transverse myelitis  Evaluation Date: 1/28/2022  Authorization Period Expiration: 3/11/22  Plan of Care Expiration: 1/28/2022 to 3/11/22  Updated Plan of Care Due: 2/18/22  Visit # / Visits authorized: 5/13  PTA Visit #: 4    Time In: 0839  Time Out: 0912  Total Billable Time: 33 minutes    Precautions: Standard and Fall    Subjective     Pt reports: "I feel stiff this morning. My legs feel weak."  He was compliant with home exercise program.  Response to previous treatment: none yet  Functional change: none yet    Pain: 0/10  Location: bilateral back, lower legs and upper legs    Objective     Rolando received therapeutic exercises to develop strength and flexibility for 33 minutes including:  · NuStep x 8 min  · SLR 2 x 10  · Heel slides 2 x 10  · Bridges 2 x 10  · LAQ 2 x 10 with 1.5 lb strap weight  · Seated hip abduction with red band x 25  · Seated marching with red band and 1.5 lb strap weight x 25  · Hip adduction with ball squeezes x 25  · 2" step-ups 2 x 10 with focus on placing LLE in same spot each repetition  · Sit to stands 2 x 10    Rolando participated in gait training to improve functional mobility and safety for 0  minutes, including:  Not this visit    Home Exercises Provided and Patient Education Provided     Education provided: HEP provided on eval    Written Home Exercises Provided: Patient instructed to cont prior HEP.  Exercises were reviewed and Rolando was able to demonstrate them prior to the end of the session.  Rolando demonstrated fair  understanding of the education provided.     See EMR under Patient Instructions for exercises provided 1/28/2022.    Assessment     Patient able to perform all exercises without increase in pain. " Patient stated he felt good after treatment.    Rolando Is progressing well towards his goals.   Pt prognosis is Fair.     Pt will continue to benefit from skilled outpatient physical therapy to address the deficits listed in the problem list box on initial evaluation, provide pt/family education and to maximize pt's level of independence in the home and community environment.     Pt's spiritual, cultural and educational needs considered and pt agreeable to plan of care and goals.     Anticipated barriers to physical therapy: long history of progressive neuropathy.    Goals:     Short Term Goals:  1. Patient will demonstrate independence with home exercise program to ensure carryover of treatment.  2. Patient will perform sit to/from stand with supervision to improve independence and safety with transfers.  3. Patient will improve bilateral lower extremity strength to 4-/5 to improve independence and safety with bed mobility, transfers, and gait.  4. Patient will improve Tinetti Balance + Gait score to 12/28 to improve dynamic standing balance and lower fall risk.   5. Patient will ambulate 250 feet with a rolling walker with supervision to improve independence and safety with gait.      Long Term Goals:  1. Patient will perform sit to/from stand with modified independence to improve independence and safety with transfers.  2. Patient will improve bilateral lower extremity strength to 4/5 to improve independence and safety with bed mobility, transfers, and gait.  3. Patient will improve Tinetti Balance + Gait score to 14/28 to improve dynamic standing balance and lower fall risk.   4. Patient will ambulate 350 feet with a rolling walker with modified independence including up/down curbs and ramps to improve independence and safety with community ambulation.      Plan     Will continue with POC as appropriate.    Ronak Otoole, PTA  2/10/2022

## 2022-02-15 ENCOUNTER — CLINICAL SUPPORT (OUTPATIENT)
Dept: REHABILITATION | Facility: HOSPITAL | Age: 82
End: 2022-02-15
Payer: MEDICARE

## 2022-02-15 DIAGNOSIS — M54.50 LOW BACK PAIN, NON-SPECIFIC: Primary | ICD-10-CM

## 2022-02-15 DIAGNOSIS — R29.898 LEG WEAKNESS, BILATERAL: ICD-10-CM

## 2022-02-15 PROCEDURE — 97110 THERAPEUTIC EXERCISES: CPT | Mod: CQ

## 2022-02-15 NOTE — PROGRESS NOTES
"  Physical Therapy Treatment Note     Name: Zaira Garcai  Clinic Number: [id number]    Therapy Diagnosis: No diagnosis found.  Physician: Ellie Dominguez II, *    Visit Date: 2/15/2022    Physician Orders: PT Eval and Treat  Medical Diagnosis from Referral: transverse myelitis  Evaluation Date: 1/28/2022  Authorization Period Expiration: 3/11/22  Plan of Care Expiration: 1/28/2022 to 3/11/22  Updated Plan of Care Due: 2/18/22  Visit # / Visits authorized: 6/13  PTA Visit #: 5    Time In: 0848  Time Out: 0918  Total Billable Time: 30 minutes    Precautions: Standard and Fall    Subjective     Pt reports: "I hurt every morning, but as I move around it goes away."  He was compliant with home exercise program.  Response to previous treatment: none yet  Functional change: none yet    Pain: 0/10  Location: bilateral back, lower legs and upper legs    Objective     Rolando received therapeutic exercises to develop strength and flexibility for 30 minutes including:  · NuStep x 8 min  · SLR 2 x 10 (lack of eccentric control on LLE)  · Heel slides 2 x 10  · Bridges 2 x 10  · LAQ 2 x 10 with 2 lb strap weight  · Seated hip abduction with red band x 25  · Seated marching with red band x 25  · Hip adduction with ball squeezes x 25  · 2" step-ups 2 x 10 with focus on placing LLE in same spot each repetition  · Sit to stands 2 x 10    Rolando participated in gait training to improve functional mobility and safety for 0  minutes, including:  Not this visit    Home Exercises Provided and Patient Education Provided     Education provided: HEP provided on eval    Written Home Exercises Provided: Patient instructed to cont prior HEP.  Exercises were reviewed and Rolando was able to demonstrate them prior to the end of the session.  Rolando demonstrated fair  understanding of the education provided.     See EMR under Patient Instructions for exercises provided 1/28/2022.    Assessment     Patient able to perform all exercises without " increase in pain. Patient stated he felt good just tired after treatment.    Rolando Is progressing well towards his goals.   Pt prognosis is Fair.     Pt will continue to benefit from skilled outpatient physical therapy to address the deficits listed in the problem list box on initial evaluation, provide pt/family education and to maximize pt's level of independence in the home and community environment.     Pt's spiritual, cultural and educational needs considered and pt agreeable to plan of care and goals.     Anticipated barriers to physical therapy: long history of progressive neuropathy.    Goals:     Short Term Goals:  1. Patient will demonstrate independence with home exercise program to ensure carryover of treatment.  2. Patient will perform sit to/from stand with supervision to improve independence and safety with transfers.  3. Patient will improve bilateral lower extremity strength to 4-/5 to improve independence and safety with bed mobility, transfers, and gait.  4. Patient will improve Tinetti Balance + Gait score to 12/28 to improve dynamic standing balance and lower fall risk.   5. Patient will ambulate 250 feet with a rolling walker with supervision to improve independence and safety with gait.      Long Term Goals:  1. Patient will perform sit to/from stand with modified independence to improve independence and safety with transfers.  2. Patient will improve bilateral lower extremity strength to 4/5 to improve independence and safety with bed mobility, transfers, and gait.  3. Patient will improve Tinetti Balance + Gait score to 14/28 to improve dynamic standing balance and lower fall risk.   4. Patient will ambulate 350 feet with a rolling walker with modified independence including up/down curbs and ramps to improve independence and safety with community ambulation.      Plan     Will continue with POC as appropriate.    Ronak Otoole, PTA  2/15/2022

## 2022-02-17 ENCOUNTER — CLINICAL SUPPORT (OUTPATIENT)
Dept: REHABILITATION | Facility: HOSPITAL | Age: 82
End: 2022-02-17
Payer: MEDICARE

## 2022-02-17 DIAGNOSIS — G60.9 IDIOPATHIC PERIPHERAL NEUROPATHY: ICD-10-CM

## 2022-02-17 DIAGNOSIS — R29.898 LEG WEAKNESS, BILATERAL: Primary | ICD-10-CM

## 2022-02-17 DIAGNOSIS — R27.0 ATAXIA: ICD-10-CM

## 2022-02-17 PROCEDURE — 97112 NEUROMUSCULAR REEDUCATION: CPT

## 2022-02-17 PROCEDURE — 97110 THERAPEUTIC EXERCISES: CPT

## 2022-02-17 NOTE — PROGRESS NOTES
"  Physical Therapy Treatment Note     Name: Zaira Garcia  Clinic Number: [id number]    Therapy Diagnosis Ataxia  Physician: Ellie Dominguez II, *    Visit Date: 2/17/2022    Physician Orders: PT Eval and Treat  Medical Diagnosis from Referral: transverse myelitis  Evaluation Date: 1/28/2022  Authorization Period Expiration: 3/11/22  Plan of Care Expiration: 1/28/2022 to 3/11/22  Updated Plan of Care Due: 3/11/22  Visit # / Visits authorized: 7/13  PTA Visit #: 0    Time In: 0842  Time Out: 0928  Total Billable Time: 46 minutes    Precautions: Standard and Fall    Subjective     Pt reports: "I hurt every day" He does report that he thinks therapy is helping him   He was compliant with home exercise program.  Response to previous treatment: improving overall  Functional change: Patient states his balance is getting better and he has not fallen in >1 week      Pain: 0/10  Location: bilateral back, lower legs and upper legs    Objective     Rolando received therapeutic exercises to develop strength and flexibility for 31 minutes including:  · NuStep x 8 min  · SAQ x 20 each LE with 1.5 lb weights at ankles  · Bridges 3 x 10  · Add ball squeezes in hooklying  x 30 with min assist for LE control  · LAQ 2 x 10 with 1.5 lb strap weight with verbal and visual cues for eccentric control  · Hooklying hip abduction with red band x 30  · Seated marching with red band x 25  · 4" step-ups 10 with manual assistance for terminal knee control bilaterally  · Sit to stands 2 x 10 in bars with verbal and visual cues for eccentric control and for neutral LE position      Rolando participated in neuromuscular re-education activities to improve: Balance, Coordination and Proprioception for 12 minutes. The following activities were included:  · PNF LE patterns x 2x10 each LE  · Closed chain terminal knee extension with manual resistance 2 x 10 each LE with constant verbal cues for speed control and coordination  · Midline anterior cone " touches 2x10 with each LE with verbal cues for smooth control of foot placement    Rolando participated in gait training to improve functional mobility and safety for 3  minutes, including:  A-P laps in parallel bars x 5 with focus on neutral ZEN and avoiding excessive wide ZEN.      Home Exercises Provided and Patient Education Provided     Education provided: updated HEP    Written Home Exercises Provided: yes.  Exercises were reviewed and Rolando was able to demonstrate them prior to the end of the session.  Rolando demonstrated good  understanding of the education provided.     See EMR under below for exercises provided 2/17/22.    Assessment     Patient with good effort overall today and able to increase to more neuro reeducation activities for functional improvement    Rolando Is progressing well towards his goals.   Pt prognosis is Fair.     Pt will continue to benefit from skilled outpatient physical therapy to address the deficits listed in the problem list box on initial evaluation, provide pt/family education and to maximize pt's level of independence in the home and community environment.     Pt's spiritual, cultural and educational needs considered and pt agreeable to plan of care and goals.     Anticipated barriers to physical therapy: long history of progressive neuropathy.    Goals:     Short Term Goals:  1. Patient will demonstrate independence with home exercise program to ensure carryover of treatment. -  [id number]  2. Patient will perform sit to/from stand with supervision to improve independence and safety with transfers. - met  3. Patient will improve bilateral lower extremity strength to 4-/5 to improve independence and safety with bed mobility, transfers, and gait. - met but not able to functionally utilize this strength   4. Patient will improve Tinetti Balance + Gait score to 12/28 to improve dynamic standing balance and lower fall risk. - progressing  5. Patient will ambulate 250 feet with a  rolling walker with supervision to improve independence and safety with gait. - met     Long Term Goals:  1. Patient will perform sit to/from stand with modified independence to improve independence and safety with transfers.   2. Patient will improve bilateral lower extremity strength to 4/5 to improve independence and safety with bed mobility, transfers, and gait.  3. Patient will improve Tinetti Balance + Gait score to 14/28 to improve dynamic standing balance and lower fall risk.   4. Patient will ambulate 350 feet with a rolling walker with modified independence including up/down curbs and ramps to improve independence and safety with community ambulation.      Plan     Will continue with Plan of care with progression as appropriate    EMANUEL DOMINGUEZ, PT, ATP  2/17/2022

## 2022-02-17 NOTE — PLAN OF CARE
"Physical Therapy Plan of care Update      Name: Zaira Garcia  Clinic Number: [id number]     Therapy Diagnosis Ataxia  Physician: Ellie Dominguez II, *     Visit Date: 2/17/2022     Physician Orders: PT Eval and Treat  Medical Diagnosis from Referral: transverse myelitis  Evaluation Date: 1/28/2022  Authorization Period Expiration: 3/11/22  Plan of Care Expiration: 1/28/2022 to 3/11/22  Updated Plan of Care Due: 3/11/22  Visit # / Visits authorized: 7/13    Subjective      Pt reports: "I hurt every day" He does report that he thinks therapy is helping him   He was compliant with home exercise program.    Objective      He has attended 6 total visits this far since eval and is receiving strengthening, neuromuscular reeducation, gait training,and balance training, as well as HEP for therapy carryover.     Assessment      Patient with good effort overall today and able to increase to more neuro reeducation activities for functional improvement     Rolando Is progressing well towards his goals.   Pt prognosis is Fair.      Pt will continue to benefit from skilled outpatient physical therapy to address the deficits listed in the problem list box on initial evaluation, provide pt/family education and to maximize pt's level of independence in the home and community environment.      Pt's spiritual, cultural and educational needs considered and pt agreeable to plan of care and goals.     Anticipated barriers to physical therapy: long history of progressive neuropathy.     Goals:     Short Term Goals:  1. Patient will demonstrate independence with home exercise program to ensure carryover of treatment. -  progressing  2. Patient will perform sit to/from stand with supervision to improve independence and safety with transfers. - met  3. Patient will improve bilateral lower extremity strength to 4-/5 to improve independence and safety with bed mobility, transfers, and gait. - met but not able to functionally utilize this " strength   4. Patient will improve Tinetti Balance + Gait score to 12/28 to improve dynamic standing balance and lower fall risk. - progressing  5. Patient will ambulate 250 feet with a rolling walker with supervision to improve independence and safety with gait. - met     Long Term Goals:  1. Patient will perform sit to/from stand with modified independence to improve independence and safety with transfers.   2. Patient will improve bilateral lower extremity strength to 4/5 to improve independence and safety with bed mobility, transfers, and gait.  3. Patient will improve Tinetti Balance + Gait score to 14/28 to improve dynamic standing balance and lower fall risk.   4. Patient will ambulate 350 feet with a rolling walker with modified independence including up/down curbs and ramps to improve independence and safety with community ambulation.       Reasons for Recertification of Therapy: goals not met    Plan     Updated Certification Period: 2/17/2022 to 3/11/22  Recommended Treatment Plan: 2 times per week for 3 weeks: Gait Training, Manual Therapy, Moist Heat/ Ice, Neuromuscular Re-ed, Patient Education, Therapeutic Activities and Therapeutic Exercise  Other Recommendations: reassess for further PT after these upcoming 3 weeks of therapy.     EMANUEL DOMINGUEZ, PT, ATP  2/17/2022      I CERTIFY THE NEED FOR THESE SERVICES FURNISHED UNDER THIS PLAN OF TREATMENT AND WHILE UNDER MY CARE.    Physician's comments:      Physician's Signature: No Signature required due to no change in Plan of care.

## 2022-02-22 ENCOUNTER — CLINICAL SUPPORT (OUTPATIENT)
Dept: REHABILITATION | Facility: HOSPITAL | Age: 82
End: 2022-02-22
Payer: MEDICARE

## 2022-02-22 DIAGNOSIS — M54.50 LOW BACK PAIN, NON-SPECIFIC: ICD-10-CM

## 2022-02-22 DIAGNOSIS — R29.898 LEG WEAKNESS, BILATERAL: Primary | ICD-10-CM

## 2022-02-22 PROCEDURE — 97112 NEUROMUSCULAR REEDUCATION: CPT | Mod: CQ

## 2022-02-22 PROCEDURE — 97116 GAIT TRAINING THERAPY: CPT | Mod: CQ

## 2022-02-22 PROCEDURE — 97110 THERAPEUTIC EXERCISES: CPT | Mod: CQ

## 2022-02-22 NOTE — PROGRESS NOTES
"  Physical Therapy Treatment Note     Name: Zaira Garcia  Clinic Number: [id number]    Therapy Diagnosis Ataxia  Physician: Ellie Dominguez II, *    Visit Date: 2/22/2022    Physician Orders: PT Eval and Treat  Medical Diagnosis from Referral: transverse myelitis  Evaluation Date: 1/28/2022  Authorization Period Expiration: 3/11/22  Plan of Care Expiration: 1/28/2022 to 3/11/22  Updated Plan of Care Due: 3/11/22  Visit # / Visits authorized: 8/13  PTA Visit #: 1    Time In: 0840  Time Out: 0925  Total Billable Time: 45 minutes    Precautions: Standard and Fall    Subjective     Pt reports: "I have pain every morning" He does report that he thinks therapy is helping him   He was compliant with home exercise program.  Response to previous treatment: improving overall  Functional change: Patient states his balance is getting better and he has not fallen in >1 week    Pain: 5/10  Location: bilateral back, lower legs and upper legs    Objective     Rolando received therapeutic exercises to develop strength and flexibility for 22 minutes including:  · NuStep x 8 min  · SAQ x 20 each LE with 1.5 lb weights at ankles  · Bridges 3 x 10  · Add ball squeezes in hooklying  x 30 with min assist for LE control  · LAQ 2 x 10 with 1.5 lb strap weight with verbal and visual cues for eccentric control  · Hooklying hip abduction with red band x 30  · Seated marching with red band x 25  · 4" step-ups 10 with manual assistance for terminal knee control bilaterally  · Sit to stands 2 x 10 in bars with verbal and visual cues for eccentric control and for neutral LE position      Rolando participated in neuromuscular re-education activities to improve: Balance, Coordination and Proprioception for 15 minutes. The following activities were included:  · PNF LE patterns x 2x10 each LE  · Closed chain terminal knee extension with manual resistance 2 x 10 each LE with constant verbal cues for speed control and coordination  · Midline " anterior cone touches 2x10 with each LE with verbal cues for smooth control of foot placement    Rolando participated in gait training to improve functional mobility and safety for 8 minutes, including:  A-P laps in parallel bars x 5 with focus on neutral ZEN and avoiding excessive wide ZEN.      Home Exercises Provided and Patient Education Provided     Education provided:     Written Home Exercises Provided: Patient instructed to cont prior HEP.  Exercises were reviewed and Rolando was able to demonstrate them prior to the end of the session.  Rolando demonstrated good  understanding of the education provided.     See EMR under below for exercises provided 2/17/22.    Assessment     Patient with good effort overall today and able to increase to more neuro reeducation activities for functional improvement    Rolando Is progressing well towards his goals.   Pt prognosis is Fair.     Pt will continue to benefit from skilled outpatient physical therapy to address the deficits listed in the problem list box on initial evaluation, provide pt/family education and to maximize pt's level of independence in the home and community environment.     Pt's spiritual, cultural and educational needs considered and pt agreeable to plan of care and goals.     Anticipated barriers to physical therapy: long history of progressive neuropathy.    Goals:     Short Term Goals:  1. Patient will demonstrate independence with home exercise program to ensure carryover of treatment. -  Progression   2. Patient will perform sit to/from stand with supervision to improve independence and safety with transfers. - met  3. Patient will improve bilateral lower extremity strength to 4-/5 to improve independence and safety with bed mobility, transfers, and gait. - met but not able to functionally utilize this strength   4. Patient will improve Tinetti Balance + Gait score to 12/28 to improve dynamic standing balance and lower fall risk. -  progressing  5. Patient will ambulate 250 feet with a rolling walker with supervision to improve independence and safety with gait. - met     Long Term Goals:  1. Patient will perform sit to/from stand with modified independence to improve independence and safety with transfers.   2. Patient will improve bilateral lower extremity strength to 4/5 to improve independence and safety with bed mobility, transfers, and gait.  3. Patient will improve Tinetti Balance + Gait score to 14/28 to improve dynamic standing balance and lower fall risk.   4. Patient will ambulate 350 feet with a rolling walker with modified independence including up/down curbs and ramps to improve independence and safety with community ambulation.      Plan     Will continue with Plan of care with progression as appropriate    Jing Barnett, PTA  2/22/2022

## 2022-02-24 ENCOUNTER — CLINICAL SUPPORT (OUTPATIENT)
Dept: REHABILITATION | Facility: HOSPITAL | Age: 82
End: 2022-02-24
Payer: MEDICARE

## 2022-02-24 DIAGNOSIS — R27.0 ATAXIA: ICD-10-CM

## 2022-02-24 DIAGNOSIS — M54.50 LOW BACK PAIN, NON-SPECIFIC: ICD-10-CM

## 2022-02-24 DIAGNOSIS — R29.898 LEG WEAKNESS, BILATERAL: ICD-10-CM

## 2022-02-24 PROCEDURE — 97110 THERAPEUTIC EXERCISES: CPT | Mod: CQ

## 2022-02-24 PROCEDURE — 97112 NEUROMUSCULAR REEDUCATION: CPT | Mod: CQ

## 2022-02-24 NOTE — PROGRESS NOTES
"  Physical Therapy Treatment Note     Name: Zaira Garcia  Clinic Number: [id number]    Therapy Diagnosis Ataxia  Physician: Ellie Dominguez II, *    Visit Date: 2/24/2022    Physician Orders: PT Eval and Treat  Medical Diagnosis from Referral: transverse myelitis  Evaluation Date: 1/28/2022  Authorization Period Expiration: 3/11/22  Plan of Care Expiration: 1/28/2022 to 3/11/22  Updated Plan of Care Due: 3/11/22  Visit # / Visits authorized: 9/13  PTA Visit #: 2    Time In: 0850  Time Out: 0925  Total Billable Time: 35 minutes    Precautions: Standard and Fall    Subjective     Pt reports: "My left knee has been giving me trouble this morning. Been hard to get up today."  He was compliant with home exercise program.  Response to previous treatment: improving overall  Functional change: Patient states his balance is getting better and he has not fallen in >1 week    Pain: 5/10  Location: left knee    Objective     Rolando received therapeutic exercises to develop strength and flexibility for 20 minutes including:  · NuStep x 8 min (stopped at 5 min due to pain in L knee)  · SAQ x 20 each LE with 1.5 lb weights at ankles  · Bridges 3 x 10  · Add ball squeezes in hooklying  x 30 with min assist for LE control  · LAQ 2 x 10 with 1.5 lb strap weight with verbal and visual cues for eccentric control  · Hooklying hip abduction with red band x 30  · Seated marching with red band x 25  · 4" step-ups 10 with manual assistance for terminal knee control bilaterally  · Chair squats 2 x 10 in bars with verbal and visual cues for eccentric control and for neutral LE position      Rolando participated in neuromuscular re-education activities to improve: Balance, Coordination and Proprioception for 10 minutes. The following activities were included:  · PNF LE patterns x 2x10 each LE (not this visit)  · Closed chain terminal knee extension with manual resistance 2 x 10 each LE with constant verbal cues for speed control and " coordination  · Midline anterior cone touches 2x10 with each LE with verbal cues for smooth control of foot placement    Rolando participated in gait training to improve functional mobility and safety for 5 minutes, including:  A-P laps in parallel bars x 5 with focus on neutral ZEN and avoiding excessive wide ZEN.      Home Exercises Provided and Patient Education Provided     Education provided:     Written Home Exercises Provided: Patient instructed to cont prior HEP.  Exercises were reviewed and Rolando was able to demonstrate them prior to the end of the session.  Rolando demonstrated good  understanding of the education provided.     See EMR under below for exercises provided 2/17/22.    Assessment     Patient able to perform all exercises this date. Patient had to stop NuStep early today due to pain in L knee.    Rolando Is progressing well towards his goals.   Pt prognosis is Fair.     Pt will continue to benefit from skilled outpatient physical therapy to address the deficits listed in the problem list box on initial evaluation, provide pt/family education and to maximize pt's level of independence in the home and community environment.     Pt's spiritual, cultural and educational needs considered and pt agreeable to plan of care and goals.     Anticipated barriers to physical therapy: long history of progressive neuropathy.    Goals:     Short Term Goals:  1. Patient will demonstrate independence with home exercise program to ensure carryover of treatment. -  Progression   2. Patient will perform sit to/from stand with supervision to improve independence and safety with transfers. - met  3. Patient will improve bilateral lower extremity strength to 4-/5 to improve independence and safety with bed mobility, transfers, and gait. - met but not able to functionally utilize this strength   4. Patient will improve Tinetti Balance + Gait score to 12/28 to improve dynamic standing balance and lower fall risk. -  progressing  5. Patient will ambulate 250 feet with a rolling walker with supervision to improve independence and safety with gait. - met     Long Term Goals:  1. Patient will perform sit to/from stand with modified independence to improve independence and safety with transfers.   2. Patient will improve bilateral lower extremity strength to 4/5 to improve independence and safety with bed mobility, transfers, and gait.  3. Patient will improve Tinetti Balance + Gait score to 14/28 to improve dynamic standing balance and lower fall risk.   4. Patient will ambulate 350 feet with a rolling walker with modified independence including up/down curbs and ramps to improve independence and safety with community ambulation.      Plan     Will continue with Plan of care with progression as appropriate    Ronak Otoole, ARNULFO  2/24/2022

## 2022-03-01 ENCOUNTER — CLINICAL SUPPORT (OUTPATIENT)
Dept: REHABILITATION | Facility: HOSPITAL | Age: 82
End: 2022-03-01
Payer: MEDICARE

## 2022-03-01 DIAGNOSIS — R29.898 LEG WEAKNESS, BILATERAL: ICD-10-CM

## 2022-03-01 DIAGNOSIS — R27.0 ATAXIA: ICD-10-CM

## 2022-03-01 DIAGNOSIS — M54.50 LOW BACK PAIN, NON-SPECIFIC: Primary | ICD-10-CM

## 2022-03-01 PROCEDURE — 97110 THERAPEUTIC EXERCISES: CPT | Mod: CQ

## 2022-03-01 PROCEDURE — 97112 NEUROMUSCULAR REEDUCATION: CPT | Mod: CQ

## 2022-03-01 NOTE — PROGRESS NOTES
"  Physical Therapy Treatment Note     Name: Zaira Garcia  Clinic Number: [id number]    Therapy Diagnosis Ataxia  Physician: Ellie Dominguez II, *    Visit Date: 3/1/2022    Physician Orders: PT Eval and Treat  Medical Diagnosis from Referral: transverse myelitis  Evaluation Date: 1/28/2022  Authorization Period Expiration: 3/11/22  Plan of Care Expiration: 1/28/2022 to 3/11/22  Updated Plan of Care Due: 3/11/22  Visit # / Visits authorized: 10/13  PTA Visit #: 3    Time In: 0849  Time Out: 0928  Total Billable Time: 39 minutes    Precautions: Standard and Fall    Subjective     Pt reports: "I fell this morning in the bathroom. I got tripped up and hit my toe on the door frame, but I was okay."  He was compliant with home exercise program.   Response to previous treatment: improving overall  Functional change: Patient states his balance is getting better    Pain: 4/10  Location: left knee    Objective     Rolando received therapeutic exercises to develop strength and flexibility for 24 minutes including:  · NuStep x 5 min (at end of session, will return to beginning of tx next visit)  · SAQ x 25 each LE with 1.5 lb weights at ankles  · Bridges 3 x 10  · Add ball squeezes in hooklying  x 30 with min assist for LE control  · LAQ 2 x 10 with 1.5 lb strap weight with verbal and visual cues for eccentric control  · Hooklying hip abduction with red band x 30  · Seated marching with red band x 25  · 4" step-ups 10 with manual assistance for terminal knee control bilaterally  · Chair squats 2 x 10 in bars with verbal and visual cues for eccentric control and for neutral LE position      Rolando participated in neuromuscular re-education activities to improve: Balance, Coordination and Proprioception for 10 minutes. The following activities were included:  · PNF LE patterns x 2x10 each LE  · Closed chain terminal knee extension with manual resistance 2 x 10 each LE with constant verbal cues for speed control and " coordination  · Midline anterior cone touches 2x10 with each LE with verbal cues for smooth control of foot placement    Rolando participated in gait training to improve functional mobility and safety for 5 minutes, including:  A-P laps in parallel bars x 5 with focus on neutral ZEN and avoiding excessive wide ZEN.      Home Exercises Provided and Patient Education Provided     Education provided:     Written Home Exercises Provided: Patient instructed to cont prior HEP.  Exercises were reviewed and Rolando was able to demonstrate them prior to the end of the session.  Rolando demonstrated good  understanding of the education provided.     See EMR under below for exercises provided 2/17/22.    Assessment     Patient able to perform all exercises this date with no increase in pain. Patient stated he felt less stiff and better after tx.    Rolando Is progressing well towards his goals.   Pt prognosis is Fair.     Pt will continue to benefit from skilled outpatient physical therapy to address the deficits listed in the problem list box on initial evaluation, provide pt/family education and to maximize pt's level of independence in the home and community environment.     Pt's spiritual, cultural and educational needs considered and pt agreeable to plan of care and goals.     Anticipated barriers to physical therapy: long history of progressive neuropathy.    Goals:     Short Term Goals:  1. Patient will demonstrate independence with home exercise program to ensure carryover of treatment. -  Progression   2. Patient will perform sit to/from stand with supervision to improve independence and safety with transfers. - met  3. Patient will improve bilateral lower extremity strength to 4-/5 to improve independence and safety with bed mobility, transfers, and gait. - met but not able to functionally utilize this strength   4. Patient will improve Tinetti Balance + Gait score to 12/28 to improve dynamic standing balance and lower fall  risk. - progressing  5. Patient will ambulate 250 feet with a rolling walker with supervision to improve independence and safety with gait. - met     Long Term Goals:  1. Patient will perform sit to/from stand with modified independence to improve independence and safety with transfers.   2. Patient will improve bilateral lower extremity strength to 4/5 to improve independence and safety with bed mobility, transfers, and gait.  3. Patient will improve Tinetti Balance + Gait score to 14/28 to improve dynamic standing balance and lower fall risk.   4. Patient will ambulate 350 feet with a rolling walker with modified independence including up/down curbs and ramps to improve independence and safety with community ambulation.      Plan     Will continue with Plan of care with progression as appropriate    Ronak Otoole, ARNULFO  3/1/2022

## 2022-03-03 ENCOUNTER — CLINICAL SUPPORT (OUTPATIENT)
Dept: REHABILITATION | Facility: HOSPITAL | Age: 82
End: 2022-03-03
Payer: MEDICARE

## 2022-03-03 DIAGNOSIS — R27.0 ATAXIA: ICD-10-CM

## 2022-03-03 DIAGNOSIS — M54.50 LOW BACK PAIN, NON-SPECIFIC: Primary | ICD-10-CM

## 2022-03-03 DIAGNOSIS — R29.898 LEG WEAKNESS, BILATERAL: ICD-10-CM

## 2022-03-03 PROCEDURE — 97112 NEUROMUSCULAR REEDUCATION: CPT | Mod: CQ

## 2022-03-03 PROCEDURE — 97110 THERAPEUTIC EXERCISES: CPT | Mod: CQ

## 2022-03-03 NOTE — PROGRESS NOTES
"  Physical Therapy Treatment Note     Name: Zaira Garcia  Clinic Number: [id number]    Therapy Diagnosis Ataxia  Physician: Ellie Dominguez II, *    Visit Date: 3/3/2022    Physician Orders: PT Eval and Treat  Medical Diagnosis from Referral: transverse myelitis  Evaluation Date: 1/28/2022  Authorization Period Expiration: 3/11/22  Plan of Care Expiration: 1/28/2022 to 3/11/22  Updated Plan of Care Due: 3/11/22  Visit # / Visits authorized: 11/13  PTA Visit #: 4    Time In: 0840 (Patient tx started prior to being checked in)  Time Out: 0912  Total Billable Time: 32 minutes    Precautions: Standard and Fall    Subjective     Pt reports: "I fell pretty good this morning. My pain is about the same."  He was compliant with home exercise program.   Response to previous treatment: improving overall  Functional change: Patient states his balance is getting better    Pain: 4/10  Location: left knee    Objective     Rolando received therapeutic exercises to develop strength and flexibility for 20 minutes including:  · NuStep x 8 min  · SAQ x 25 each LE with 1.5 lb weights at ankles  · Bridges 3 x 10  · Add ball squeezes in hooklying  x 30 with min assist for LE control  · LAQ 2 x 10 with 1.5 lb strap weight with verbal and visual cues for eccentric control  · Hooklying hip abduction with red band x 30  · Seated marching with red band x 25  · 4" step-ups 10 with manual assistance for terminal knee control bilaterally  · Chair squats 2 x 10 in bars with verbal and visual cues for eccentric control and for neutral LE position      Rolando participated in neuromuscular re-education activities to improve: Balance, Coordination and Proprioception for 8 minutes. The following activities were included:  · PNF LE patterns x 2x10 each LE  · Closed chain terminal knee extension with manual resistance 2 x 10 each LE with constant verbal cues for speed control and coordination  · Midline anterior cone touches 2x10 with each LE with " verbal cues for smooth control of foot placement    Rolando participated in gait training to improve functional mobility and safety for 4 minutes, including:  A-P laps in parallel bars x 5 with focus on neutral ZEN and avoiding excessive wide ZEN.      Home Exercises Provided and Patient Education Provided     Education provided:     Written Home Exercises Provided: Patient instructed to cont prior HEP.  Exercises were reviewed and Rolando was able to demonstrate them prior to the end of the session.  Rolando demonstrated good  understanding of the education provided.     See EMR under below for exercises provided 2/17/22.    Assessment     Patient able to perform all exercises this date with no increase in pain. Patient stated he felt good after tx.    Rolando Is progressing well towards his goals.   Pt prognosis is Fair.     Pt will continue to benefit from skilled outpatient physical therapy to address the deficits listed in the problem list box on initial evaluation, provide pt/family education and to maximize pt's level of independence in the home and community environment.     Pt's spiritual, cultural and educational needs considered and pt agreeable to plan of care and goals.     Anticipated barriers to physical therapy: long history of progressive neuropathy.    Goals:     Short Term Goals:  1. Patient will demonstrate independence with home exercise program to ensure carryover of treatment. -  Progression   2. Patient will perform sit to/from stand with supervision to improve independence and safety with transfers. - met  3. Patient will improve bilateral lower extremity strength to 4-/5 to improve independence and safety with bed mobility, transfers, and gait. - met but not able to functionally utilize this strength   4. Patient will improve Tinetti Balance + Gait score to 12/28 to improve dynamic standing balance and lower fall risk. - progressing  5. Patient will ambulate 250 feet with a rolling walker with  supervision to improve independence and safety with gait. - met     Long Term Goals:  1. Patient will perform sit to/from stand with modified independence to improve independence and safety with transfers.   2. Patient will improve bilateral lower extremity strength to 4/5 to improve independence and safety with bed mobility, transfers, and gait.  3. Patient will improve Tinetti Balance + Gait score to 14/28 to improve dynamic standing balance and lower fall risk.   4. Patient will ambulate 350 feet with a rolling walker with modified independence including up/down curbs and ramps to improve independence and safety with community ambulation.      Plan     Will continue with Plan of care with progression as appropriate    Ronak Otoole, ARNULFO  3/3/2022

## 2022-03-08 ENCOUNTER — CLINICAL SUPPORT (OUTPATIENT)
Dept: REHABILITATION | Facility: HOSPITAL | Age: 82
End: 2022-03-08
Payer: MEDICARE

## 2022-03-08 DIAGNOSIS — R29.898 LEG WEAKNESS, BILATERAL: Primary | ICD-10-CM

## 2022-03-08 PROCEDURE — 97110 THERAPEUTIC EXERCISES: CPT

## 2022-03-08 PROCEDURE — 97116 GAIT TRAINING THERAPY: CPT

## 2022-03-08 NOTE — PLAN OF CARE
Outpatient Therapy Discharge Summary     Name: Zaira Garcia  Clinic Number: [id number]    Therapy Diagnosis:   Encounter Diagnosis   Name Primary?    Leg weakness, bilateral Yes     Physician: Ellie Dominguez II, *    Physician Orders: PT Eval and Treat  Medical Diagnosis from Referral: transverse myelitis  Evaluation Date: 1/28/2022  Date of Last visit: today  Total Visits Received: 12  Cancelled Visits: 0  No Show Visits: 0    Assessment      Goals:     Short Term Goals:  1. Patient will demonstrate independence with home exercise program to ensure carryover of treatment. -  Met  2. Patient will perform sit to/from stand with supervision to improve independence and safety with transfers. - met  3. Patient will improve bilateral lower extremity strength to 4-/5 to improve independence and safety with bed mobility, transfers, and gait. - met   4. Patient will improve Tinetti Balance + Gait score to 12/28 to improve dynamic standing balance and lower fall risk. - met  5. Patient will ambulate 250 feet with a rolling walker with supervision to improve independence and safety with gait. - met     Long Term Goals:  1. Patient will perform sit to/from stand with modified independence to improve independence and safety with transfers. met  2. Patient will improve bilateral lower extremity strength to 4/5 to improve independence and safety with bed mobility, transfers, and gait. - met  3. Patient will improve Tinetti Balance + Gait score to 14/28 to improve dynamic standing balance and lower fall risk. - not met  4. Patient will ambulate 350 feet with a rolling walker with modified independence including up/down curbs and ramps to improve independence and safety with community ambulation. - verbal cues required       Discharge reason: Patient has reached the maximum rehab potential for the present time    Plan   This patient is discharged from Physical Therapy.    EMANUEL DOMINGUEZ, PT.ATP  03/08/2022

## 2022-03-08 NOTE — PROGRESS NOTES
"  Physical Therapy Treatment Note     Name: Zaira aGrcia  Clinic Number: [id number]    Therapy Diagnosis Ataxia  Physician: Ellie Dominguez II, *    Visit Date: 3/8/2022    Physician Orders: PT Eval and Treat  Medical Diagnosis from Referral: transverse myelitis  Evaluation Date: 1/28/2022  Authorization Period Expiration: 3/11/22  Plan of Care Expiration: 1/28/2022 to 3/11/22  Updated Plan of Care Due: 3/11/22  Visit # / Visits authorized: 12/13  PTA Visit #: 0    Time In: 0849  Time Out: 0928  Total Billable Time: 39 minutes    Precautions: Standard and Fall    Subjective     Pt reports: he has a sore hip due to a fall in his shop.  He states he just lost his balance and fell trying to get out of the bathroom   He was compliant with home exercise program.   Response to previous treatment: improving overall  Functional change: Patient states his balance is getting better    Pain: 7/10  Location: left knee    Objective     Rolando received therapeutic exercises to develop strength and flexibility for 24 minutes including:  · NuStep x 8 min  · Bridges 3 x 10  · Add ball squeezes in hooklying  x 30 with min assist for LE control  · LAQ 2 x 10 with verbal and visual cues for eccentric control  · Hooklying hip abduction with red band x 30  · Seated marching with red band x 25  · 4" step-ups 10 with manual assistance for terminal knee control bilaterally  · Chair squats 2 x 10 in bars with verbal and visual cues for eccentric control and for neutral LE position      Rolando participated in neuromuscular re-education activities to improve: Balance, Coordination and Proprioception for5 minutes. The following activities were included:  TUG test in 31.74 seconds  Hermelinda 12/28    Rolando participated in gait training to improve functional mobility and safety for 10 minutes, including:  Gait training on level surfaces with RW and verbal cuing for step length and foot clearance.  Educated patient's wife on verbal cuing for " carryover at home.            Home Exercises Provided and Patient Education Provided     Education provided:     Written Home Exercises Provided: yes.  Exercises were reviewed and Rolando was able to demonstrate them prior to the end of the session.  Rolando demonstrated good  understanding of the education provided.     See EMR under below for exercises provided today.          Assessment     Patient has progressed towards goals and has plateaued with overall progress.  Will discharge to [facility]    Rolando Is progressing well towards his goals.   Pt prognosis is Fair.   Pt's spiritual, cultural and educational needs considered and pt agreeable to plan of care and goals.     Anticipated barriers to physical therapy: long history of progressive neuropathy.    Goals:     Short Term Goals:  1. Patient will demonstrate independence with home exercise program to ensure carryover of treatment. -  Met  2. Patient will perform sit to/from stand with supervision to improve independence and safety with transfers. - met  3. Patient will improve bilateral lower extremity strength to 4-/5 to improve independence and safety with bed mobility, transfers, and gait. - met   4. Patient will improve Tinetti Balance + Gait score to 12/28 to improve dynamic standing balance and lower fall risk. - met  5. Patient will ambulate 250 feet with a rolling walker with supervision to improve independence and safety with gait. - met     Long Term Goals:  1. Patient will perform sit to/from stand with modified independence to improve independence and safety with transfers. met  2. Patient will improve bilateral lower extremity strength to 4/5 to improve independence and safety with bed mobility, transfers, and gait. - met  3. Patient will improve Tinetti Balance + Gait score to 14/28 to improve dynamic standing balance and lower fall risk. - not met  4. Patient will ambulate 350 feet with a rolling walker with modified independence including up/down curbs  and ramps to improve independence and safety with community ambulation. - verbal cues required      Plan     Discharge to [facility]    EMANUEL DOMINGUEZ, PT, ATP  3/8/2022

## 2022-05-13 ENCOUNTER — HOSPITAL ENCOUNTER (OUTPATIENT)
Dept: RADIOLOGY | Facility: HOSPITAL | Age: 82
Discharge: HOME | End: 2022-05-13
Attending: NURSE PRACTITIONER
Payer: MEDICARE

## 2022-05-13 DIAGNOSIS — R05.9 COUGH: ICD-10-CM

## 2022-05-13 DIAGNOSIS — R06.02 SOB (SHORTNESS OF BREATH): ICD-10-CM

## 2022-05-13 DIAGNOSIS — R06.02 SOB (SHORTNESS OF BREATH): Primary | ICD-10-CM

## 2022-05-13 PROCEDURE — 71046 X-RAY EXAM CHEST 2 VIEWS: CPT | Mod: TC

## 2022-08-01 DIAGNOSIS — R29.898 BILATERAL LEG WEAKNESS: Primary | ICD-10-CM

## 2022-08-23 ENCOUNTER — CLINICAL SUPPORT (OUTPATIENT)
Dept: REHABILITATION | Facility: HOSPITAL | Age: 82
End: 2022-08-23
Payer: MEDICARE

## 2022-08-23 DIAGNOSIS — R29.898 BILATERAL LEG WEAKNESS: ICD-10-CM

## 2022-08-23 PROCEDURE — 97162 PT EVAL MOD COMPLEX 30 MIN: CPT

## 2022-08-23 NOTE — PLAN OF CARE
[facility]   Physical Therapy Initial Evaluation    Name: Zaira Garcia  Clinic Number: [id number]    Therapy Diagnosis:   Encounter Diagnosis   Name Primary?    Bilateral leg weakness      Physician: Ellie Dominguez II, *     Physician Orders: PT Eval and Treat   Medical Diagnosis from Referral: Bilateral leg weakness [R29.898]  Evaluation Date: 8/23/2022  Authorization Period Expiration: 2023  Plan of Care Expiration: 10/18/2022   Visit # / Visits authorized: 1/ 1 (eval)  FOTO:1/3  PTA visit: 0/6    Precautions: Standard, Diabetes, Fall and Weightbearing    Time In: 8:38  Time Out: 9:05  Total Treatment Time: 27 minutes    Subjective   Date of onset: Chronic    History of current condition - Rolando reports: that he can hardly walk, his legs kill him. Patient reports he been here several times over the year. Gotten bad over past two and three years. Patient reports that he has a habit that his left foot turns out. Patient reports that the PT, has made improvement in PT. Patient has been diagnosed with neuorapthy. Fww for about year. last fall last week, neiboor comes pick him up. Office in shop and geting some dead, sitting in office chair and went into wheelcahir and the wheelcahir flipped. Patient has scooter for outdoor ambulation.  Last month or two has been sitting at the hose and not to much he can do about it.     Medical History:   Past Medical History:   Diagnosis Date    GERD (gastroesophageal reflux disease)     Hypercholesterolemia        Surgical History:   Zaira Garcia  has a past surgical history that includes Carpal tunnel release (Left).    Medications:   Zaira has a current medication list which includes the following prescription(s): baclofen, ergocalciferol, gabapentin, omeprazole, and rosuvastatin.    Allergies:   Review of patient's allergies indicates:  No Known Allergies     Pain:  Current 5/10, worst 5/10, best 5/10   Location: bilateral knee  and  lower legs  Description: Aching  Aggravating Factors: Walking and Morning  Easing Factors: nothing      Objective     Sensation:  Sensation is intact to light touch    (x = not tested due to pain and/or inability to obtain test position)    RANGE OF MOTION:    Hip AROM/PROM Right  8/23/2022 Left  8/23/2022 Goal   Hip Flexion (120) 40 [phone]  Initial:      STRENGTH:    Hip/Knee MMT Right  8/23/2022 Goal   Hip Flexion  4-/5 5/5 B    Hip Extension  4-/5 5/5 B   Hip Abduction  4-/5 5/5 B   Knee Flexion 4-/5 5/5 B   Knee Extension 4-/5 5/5 B     Hip/Knee MMT Left  8/23/2022 Goal   Hip Flexion  3+/5 5/5 B    Hip Extension  3+/5 5/5 B   Hip Abduction  3/5 5/5 B   Knee Flexion 3+/5 5/5 B   Knee Extension 3+/5 5/5 B       Special Tests:   Test Right Left Goal   FADDIR positive positive Negative   MANUEL positive positive Negative       Muscle Flexibility: Patient presents with flexibility limitations including but not limited to; bilateral hamstring and posterior chain tightness    Posture:  Pt presents with postural abnormalities which include: forward head, rounded shoulders  and forward trunk lean    Gait Analysis: The patient ambulated with the following assistive device: standard walker; the pt presents with the following gait abnormalities: LE hip ER gait     Movement Analysis:     Rehab Tests  Outcome Norms GOAL   Five Time Sit to Stand 40 60s: <11.4 sec  70s: <12.6 sec  80s: 14.8 sec 30   Timed Up and Go 30 <13.5 sec 2-   Balance:    RIGHT  8/23/2022 LEFT  8/23/2022 Goal   Closed 5 second feet together x 60 seconds     Tandem x x 20 seconds     Single Leg x x 20 seconds           TREATMENT     Total Billable time separate from Evaluation:  lexi Marshall received therapeutic exercises to develop  for  minutes including:    TherEx 8/23/2022    Quad Set x    SAQ x    SLR x    Seated Marching x      Plan for Next Visit:        Home Exercises and Patient Education Provided    Education provided:    Patient educated on  the impairments noted above and the effects of physical therapy intervention to improve overall condition and QOL.    Patient was educated on all the above exercise prior/during/after for proper posture, positioning, and execution for safe performance with home exercise program.    Written Home Exercises Provided: yes.  Exercises were reviewed and Rolando was able to demonstrate them prior to the end of the session.  Rolando demonstrated good understanding of the education provided.     See EMR under Patient Instructions for exercises provided 8/23/2022.    Assessment     Zaira is a 81 y.o. male referred to outpatient Physical Therapy with a medical diagnosis of Bilateral leg weakness [R29.898] . Zaira presents with clinical signs and symptoms that support this diagnosis with decreased lower extremity strength, decreased knee and hip ROM, decreased hip girdle, quad, and hamstring Strength, patellar joint hypomobility, increased joint and soft tissue edema, and impaired functional mobility.    The above impairments will be addressed through manual therapy techniques, therapeutic exercises, functional training, and modalities as necessary. Patient was treated and educated on exercises for home program, progression of therapy, and benefits of therapy to achieve full functional mobility. Patient will benefit from skilled physical therapy to meet short and long term goals and return to prior level of function.    Pt prognosis is Good.   Pt will benefit from skilled outpatient Physical Therapy to address the deficits stated above and in the chart below, provide pt/family education, and to maximize pt's level of independence.     Plan of care discussed with patient: Yes  Pt's spiritual, cultural and educational needs considered and patient is agreeable to the plan of care and goals as stated below:     Anticipated Barriers for therapy: chronic, age, corborbidies, prior therapy     Medical Necessity is demonstrated by the  following  History  Co-morbidities and personal factors that may impact the plan of care Co-morbidities:   advanced age, diabetes, high BMI and HTN    Personal Factors:   age     moderate   Examination  Body Structures and Functions, activity limitations and participation restrictions that may impact the plan of care Body Regions:   lower extremities    Body Systems:    ROM  strength  balance  gait  motor control    Participation Restrictions:   none    Activity limitations:   Learning and applying knowledge  no deficits    General Tasks and Commands  no deficits    Communication  no deficits    Mobility  walking  moving around using equipment (WC)  using transportation (bus, train, plane, car)  driving (bike, car, motorcycle)    Self care  no deficits    Domestic Life  shopping  cooking  doing house work (cleaning house, washing dishes, laundry)  assisting others    Interactions/Relationships  no deficits    Life Areas  no deficits    Community and Social Life  community life  recreation and leisure         high   Clinical Presentation evolving clinical presentation with changing clinical characteristics moderate   Decision Making/ Complexity Score: moderate     GOALS:  SHORT TERM GOALS: 4 weeks    1. Recent signs and systems trend is improving in order to progress towards LTG's.    2. Patient will be independent with HEP in order to further progress and return to maximal function.    3. Patient will improve AROM to 50% of stated goals, listed in objective measures above, in order to progress towards independence with functional activities.     4. Pain rating at Worst: 5/10 in order to progress towards increased independence with activity.    5. Patient will be able to correct postural deviations in sitting and standing, to decrease pain and promote postural awareness for injury prevention.       LONG TERM GOALS: 8 weeks    1. Patient will return to normal ADL, recreational, and work related activities with less  pain and limitation.     2. Patient will improve AROM to stated goals in order to return to maximal functional potential.     3. Patient will improve Strength to stated goals of appropriate musculature in order to improve functional independence.     4. Pain Rating at Best: 1/10 to improve Quality of Life.               5. Patient will meet predicted functional outcome (FOTO) score: 10% to increase self-worth & perceived functional ability.                PM=Partially Met     PC=Progressing/Continued     M=Met    Plan   Plan of care Certification: 8/23/2022 to 10/18/2022.    Outpatient Physical Therapy 2 times weekly for 8 weeks to include any combination of the following interventions: virtual visits, dry needling, modalities, electrical stimulation (IFC, Pre-Mod, Attended with Functional Dry Needling), Manual Therapy, Moist Heat/ Ice, Neuromuscular Re-ed, Patient Education, Self Care, Therapeutic Activities, Therapeutic Exercise, Functional Training and Therapeutic Activites     Thank you for this referral.    These services are reasonable and necessary for the conditions set forth above while under my care.    Zuleima Sunshine, PT       Signatue X_____________________________________________

## 2022-08-26 ENCOUNTER — CLINICAL SUPPORT (OUTPATIENT)
Dept: REHABILITATION | Facility: HOSPITAL | Age: 82
End: 2022-08-26
Payer: MEDICARE

## 2022-08-26 DIAGNOSIS — M54.50 LOW BACK PAIN, NON-SPECIFIC: Primary | ICD-10-CM

## 2022-08-26 DIAGNOSIS — R29.898 LEG WEAKNESS, BILATERAL: ICD-10-CM

## 2022-08-26 PROCEDURE — 97110 THERAPEUTIC EXERCISES: CPT

## 2022-08-26 NOTE — PROGRESS NOTES
[facility]   Physical Therapy Treatment Note     Name: Zaira Garcia  Clinic Number: [id number]    Therapy Diagnosis:   Encounter Diagnoses   Name Primary?    Low back pain, non-specific Yes    Leg weakness, bilateral      Physician: Ellie Dominguez II, *    Visit Date: 8/26/2022    Physician: Ellie Dominguez II, *      Physician Orders: PT Eval and Treat   Medical Diagnosis from Referral: Bilateral leg weakness [R29.898]  Evaluation Date: 8/23/2022  Authorization Period Expiration: 2023  Plan of Care Expiration: 10/18/2022              Visit # / Visits authorized: 1/ 17 (+1 eval)  FOTO:1/3  PTA visit: 0/6    Time In: 8:45 AM  Time Out: 9:20 AM  Total Billable Time: 35 minutes    SUBJECTIVE     Pt reports: He reports no change, but he has been some what complaint with his home exercise program.     He/She was partially compliant with home exercise program.    Response to previous treatment: Same    Functional change: On Going    Pain: 5/10  Location: bilateral lower legs     OBJECTIVE     Objective Measures updated at progress report unless specified.     Heel to shin test for ataxia     7/10 with LLE    2/10 RLE    Treatment     Time to Complete Exercises:     Rolando received therapeutic exercises to develop strength, endurance, ROM, flexibility, and posture for  minutes including: x = exercises performed     TherEx 8/26/2022    Quad set (bilateral) x 3 x 10   SAQ (bilateral) x 3 x 10    SLR (bilateral)  x 2 x 10    Bridges x 2 x 10    Supine Hip Abduction x 3 x 10    Supine Hip Adduction x 3 x 10   LAQ (bilateral) x 3 x 10                                         Plan for Next Visit:        NEUROMUSCULAR RE-EDUCATION ACTIVITIES to improve Balance, Coordination, Kinesthetic, Sense, Proprioception and Posture for  minutes.  The following were included:    Intervention Performed Today                                              Plan for Next Visit:          PATIENT EDUCATION  AND HOME EXERCISES     Home Exercises Provided and Patient Education Provided     Education provided:  minutes   Patient educated on biomechanical justification for therapeutic exercise and importance of compliance with HEP in order to improve overall impairments and QOL    Patient was educated on all the above exercise prior/during/after for proper posture, positioning, and execution for safe performance with home exercise program.     Written Home Exercises Provided: yes.  Exercises were reviewed and Rolando was able to demonstrate them prior to the end of the session.  Rolando demonstrated good  understanding of the education provided. See EMR under Patient Instructions for exercises provided during therapy sessions.      ASSESSMENT     Rolando Garcia tolerated PT session well with no  complaints of pain or discomfort, secondary. Patient presents with ataxic movments more noticeable with eccentric control on right lower extremity.  Heel to shin test perform and postive for ataxia; family educated to follow up with neurologist. Will continue to monitor and treat according.   Therapy exercises were reviewed by revisiting exercises given from previous home exercise program while adding no new exercises .  Handouts were not issued during today's visit. Zaira demonstrated good understanding of new exercises and will continue to progress at home until next follow-up.       Rolando Is progressing well towards his goals.     Pt prognosis is Good.     Pt will continue to benefit from skilled outpatient physical therapy to address the deficits listed in the problem list box on initial evaluation, provide pt/family education and to maximize pt's level of independence in the home and community environment.     Pt's spiritual, cultural and educational needs considered and pt agreeable to plan of care and goals.      Anticipated Barriers for therapy: chronic, age, corborbidies : advanced age, diabetes, high BMI and HTN ,  prior therapy        GOALS:    SHORT TERM GOALS: 4 weeks     1. Recent signs and systems trend is improving in order to progress towards LTG's.     2. Patient will be independent with HEP in order to further progress and return to maximal function.     3. Patient will improve AROM to 50% of stated goals, listed in objective measures above, in order to progress towards independence with functional activities.      4. Pain rating at Worst: 5/10 in order to progress towards increased independence with activity.     5. Patient will be able to correct postural deviations in sitting and standing, to decrease pain and promote postural awareness for injury prevention.         LONG TERM GOALS: 8 weeks     1. Patient will return to normal ADL, recreational, and work related activities with less pain and limitation.      2. Patient will improve AROM to stated goals in order to return to maximal functional potential.      3. Patient will improve Strength to stated goals of appropriate musculature in order to improve functional independence.      4. Pain Rating at Best: 1/10 to improve Quality of Life.               5. Patient will meet predicted functional outcome (FOTO) score: 10% to increase self-worth & perceived functional ability.                     PC = progressing/continue  PM= partially met  DC= discontinue    PLAN     Continue Plan of Care (POC) and progress per patient tolerance. See Treatment section for exercise progression.    Zuleima Sunshine, PT

## 2022-08-26 NOTE — PROGRESS NOTES
"  Physical Therapy Treatment Note     Name: Zaira Garcia  Clinic Number: [id number]    Therapy Diagnosis: No diagnosis found.  Physician: Ellie Dominguez II, *    Visit Date: 8/26/2022    Physician Orders: PT Eval and Treat   Medical Diagnosis from Referral: Bilateral leg weakness [R29.898]  Evaluation Date: 8/23/2022  Authorization Period Expiration: 2023  Plan of Care Expiration: 10/18/2022   Visit # / Visits authorized: 2/17   PTA Visit #: 1    Time In: ***  Time Out: ***  Total Billable Time: *** minutes    Precautions: Standard, Diabetes and Fall    Subjective     Pt reports: ***.  He was compliant with home exercise program.    Pain: {0-10:00126::"0"}/10  Location: bilateral lower legs     Objective     Rolando received therapeutic exercises to develop strength for *** minutes including:  · NuStep x 8 min  · SAQ 2 x 10 each LE   · Bridges 2 x 10  · Add ball squeezes in hooklying  2 x 10  · LAQ 2 x 10   · Hooklying hip abduction with red band 2 x 10  · Seated marching 2 x 10  · Chair squats 2 x 10 in bars with verbal and visual cues for eccentric control and for neutral LE position    Home Exercises Provided and Patient Education Provided     Education provided: no new exercises added this visit     Written Home Exercises Provided: Patient instructed to cont prior HEP.  Exercises were reviewed and Rolando was able to demonstrate them prior to the end of the session.  Rolando demonstrated good  understanding of the education provided.     See EMR under Patient Instructions for exercises provided prior visit.    Assessment     Patient had no new complaints of pain following treatment today.   Rolando Is progressing well towards his goals.   Pt prognosis is Good.     Pt will continue to benefit from skilled outpatient physical therapy to address the deficits listed in the problem list box on initial evaluation, provide pt/family education and to maximize pt's level of independence in the home and community " environment.     Pt's spiritual, cultural and educational needs considered and pt agreeable to plan of care and goals.     Anticipated barriers to physical therapy: chronic, age, corborbidies, prior therapy    Goals:    SHORT TERM GOALS: 4 weeks     1. Recent signs and systems trend is improving in order to progress towards LTG's.     2. Patient will be independent with HEP in order to further progress and return to maximal function.     3. Patient will improve AROM to 50% of stated goals, listed in objective measures above, in order to progress towards independence with functional activities.      4. Pain rating at Worst: 5/10 in order to progress towards increased independence with activity.     5. Patient will be able to correct postural deviations in sitting and standing, to decrease pain and promote postural awareness for injury prevention.         LONG TERM GOALS: 8 weeks     1. Patient will return to normal ADL, recreational, and work related activities with less pain and limitation.      2. Patient will improve AROM to stated goals in order to return to maximal functional potential.      3. Patient will improve Strength to stated goals of appropriate musculature in order to improve functional independence.      4. Pain Rating at Best: 1/10 to improve Quality of Life.               5. Patient will meet predicted functional outcome (FOTO) score: 10% to increase self-worth & perceived functional ability.      Plan     Continue with appropriate POC    Jing Barnett, PTA  8/26/2022

## 2022-08-31 ENCOUNTER — CLINICAL SUPPORT (OUTPATIENT)
Dept: REHABILITATION | Facility: HOSPITAL | Age: 82
End: 2022-08-31
Payer: MEDICARE

## 2022-08-31 PROCEDURE — 97112 NEUROMUSCULAR REEDUCATION: CPT

## 2022-08-31 PROCEDURE — 97110 THERAPEUTIC EXERCISES: CPT

## 2022-08-31 NOTE — PROGRESS NOTES
[facility]   Physical Therapy Treatment Note     Name: Zaira Garcia  Clinic Number: [id number]    Therapy Diagnosis:   No diagnosis found.    Physician: Ellie Dominguez II, *    Visit Date: 8/31/2022    Physician: Ellie Dominguez II, *      Physician Orders: PT Eval and Treat   Medical Diagnosis from Referral: Bilateral leg weakness [R29.898]  Evaluation Date: 8/23/2022  Authorization Period Expiration: 2023  Plan of Care Expiration: 10/18/2022              Visit # / Visits authorized: 2/ 17 (+1 eval)  FOTO:1/3  PTA visit: 0/6    Time In: 8:36  AM  Time Out:  9:15 AM  Total Billable Time: 39 minutes    SUBJECTIVE     Pt reports: Patient reports they still have not followed up with Neurologist but they will do there best, patient reports that he is feeling stronger since coming in therapy.     He/She was partially compliant with home exercise program.    Response to previous treatment: Same    Functional change: On Going    Pain: 5/10  Location: bilateral lower legs     OBJECTIVE     Objective Measures updated at progress report unless specified.       Treatment     Time to Complete Exercises:     Rolando received therapeutic exercises to develop strength, endurance, ROM, flexibility, and posture for 24 minutes including: x = exercises performed     TherEx 8/31/2022    Quad set (bilateral) x 3 x 10   SAQ (bilateral) x 3 x 10    SLR (bilateral)  x 2 x 10    Bridges x 2 x 10    Supine Hip Abduction x 3 x 10    Supine Hip Adduction  3 x 10   LAQ (bilateral)  3 x 10    Nu Step x 6 min                                   Plan for Next Visit:        NEUROMUSCULAR RE-EDUCATION ACTIVITIES to improve Balance, Coordination, Kinesthetic, Sense, Proprioception and Posture for 15 minutes.  The following were included:    Intervention Performed Today    Standing marching  x 3 x 10 in parallell bars   NBOS  x X 2 (1 min)   Three way hip x 2 x 10 in parallel bars   NBOS on Airex  x X 3 (1 min)                          Plan for Next Visit:          PATIENT EDUCATION AND HOME EXERCISES     Home Exercises Provided and Patient Education Provided     Education provided:  minutes  Patient educated on biomechanical justification for therapeutic exercise and importance of compliance with HEP in order to improve overall impairments and QOL   Patient was educated on all the above exercise prior/during/after for proper posture, positioning, and execution for safe performance with home exercise program.     Written Home Exercises Provided: yes.  Exercises were reviewed and Rolando was able to demonstrate them prior to the end of the session.  Rolando demonstrated good  understanding of the education provided. See EMR under Patient Instructions for exercises provided during therapy sessions.      ASSESSMENT     Rolando Garcia tolerated PT session well with no  complaints of pain or discomfort, secondary. Patient presents with ataxic movments more noticeable with eccentric control on right lower extremity.  Patient unable to perform marching standing without parallel bars and unable to  LLE without support. Verbal and tactile cues for eccentric control. Therapy exercises were reviewed by revisiting exercises given from previous home exercise program while adding no new exercises .  Handouts were not issued during today's visit. Zaira demonstrated good understanding of new exercises and will continue to progress at home until next follow-up.       Rolando Is progressing well towards his goals.     Pt prognosis is Fair.     Pt will continue to benefit from skilled outpatient physical therapy to address the deficits listed in the problem list box on initial evaluation, provide pt/family education and to maximize pt's level of independence in the home and community environment.     Pt's spiritual, cultural and educational needs considered and pt agreeable to plan of care and goals.      Anticipated Barriers for therapy:  chronic, age, corborbidies : advanced age, diabetes, high BMI and HTN , prior therapy        GOALS:    SHORT TERM GOALS: 4 weeks     Recent signs and systems trend is improving in order to progress towards LTG's.     Patient will be independent with HEP in order to further progress and return to maximal function.     Patient will improve AROM to 50% of stated goals, listed in objective measures above, in order to progress towards independence with functional activities.      Pain rating at Worst: 5/10 in order to progress towards increased independence with activity.     Patient will be able to correct postural deviations in sitting and standing, to decrease pain and promote postural awareness for injury prevention.         LONG TERM GOALS: 8 weeks     Patient will return to normal ADL, recreational, and work related activities with less pain and limitation.      Patient will improve AROM to stated goals in order to return to maximal functional potential.      Patient will improve Strength to stated goals of appropriate musculature in order to improve functional independence.      Pain Rating at Best: 1/10 to improve Quality of Life.               Patient will meet predicted functional outcome (FOTO) score: 10% to increase self-worth & perceived functional ability.                     PC = progressing/continue  PM= partially met  DC= discontinue    PLAN     Continue Plan of Care (POC) and progress per patient tolerance. See Treatment section for exercise progression.    Zuleima Sunshine, PT

## 2022-09-02 ENCOUNTER — CLINICAL SUPPORT (OUTPATIENT)
Dept: REHABILITATION | Facility: HOSPITAL | Age: 82
End: 2022-09-02
Payer: MEDICARE

## 2022-09-02 DIAGNOSIS — R29.898 LEG WEAKNESS, BILATERAL: Primary | ICD-10-CM

## 2022-09-02 PROCEDURE — 97110 THERAPEUTIC EXERCISES: CPT | Mod: CQ

## 2022-09-02 PROCEDURE — 97112 NEUROMUSCULAR REEDUCATION: CPT | Mod: CQ

## 2022-09-02 NOTE — PROGRESS NOTES
"  Physical Therapy Treatment Note     Name: Zaira Garcia  Clinic Number: [id number]    Therapy Diagnosis:   Encounter Diagnosis   Name Primary?    Leg weakness, bilateral Yes     Physician: Ellie Dominguez II, *    Visit Date: 9/2/2022    Physician Orders: PT Eval and Treat   Medical Diagnosis from Referral: Bilateral leg weakness [R29.898]  Evaluation Date: 8/23/2022  Authorization Period Expiration: 2023  Plan of Care Expiration: 10/18/2022    Visit # / Visits authorized: 4/17   PTA Visit #: 1    Time In: 0834  Time Out: 0909  Total Billable Time: 35 minutes    Precautions: Standard, Diabetes, and Fall    Subjective     Pt reports: "about the same".  He was compliant with home exercise program.    Pain: 5/10  Location: bilateral lower legs     Objective     Rolando received therapeutic exercises to develop strength for 20 minutes including:  Nu step x 6 min  QS 3 x 10  SAQ 3 x 10  SLR 2 x 10  Bridges 2 x 10  Hip abduction 3 x 10  Hip adduction 3 x 10    Rolando participated in neuromuscular re-education activities to improve: Balance for 15 minutes. The following activities were included:  Standing marching 3 x 10  Three way hip 2 x 10    Home Exercises Provided and Patient Education Provided     Education provided: no new exercises added today    Written Home Exercises Provided: Patient instructed to cont prior HEP.  Exercises were reviewed and Rolando was able to demonstrate them prior to the end of the session.  Rolando demonstrated good  understanding of the education provided.     See EMR under Patient Instructions for exercises provided prior visit.    Assessment     Patient had no new complaints following treatment today   Rolando Is progressing well towards his goals.   Pt prognosis is Good.     Pt will continue to benefit from skilled outpatient physical therapy to address the deficits listed in the problem list box on initial evaluation, provide pt/family education and to maximize pt's level of " independence in the home and community environment.     Pt's spiritual, cultural and educational needs considered and pt agreeable to plan of care and goals.     Anticipated barriers to physical therapy: chronic, age, corborbidies : advanced age, diabetes, high BMI and HTN , prior therapy     Goals:    SHORT TERM GOALS: 4 weeks     Recent signs and systems trend is improving in order to progress towards LTG's.     Patient will be independent with HEP in order to further progress and return to maximal function.     Patient will improve AROM to 50% of stated goals, listed in objective measures above, in order to progress towards independence with functional activities.      Pain rating at Worst: 5/10 in order to progress towards increased independence with activity.     Patient will be able to correct postural deviations in sitting and standing, to decrease pain and promote postural awareness for injury prevention.         LONG TERM GOALS: 8 weeks     Patient will return to normal ADL, recreational, and work related activities with less pain and limitation.      Patient will improve AROM to stated goals in order to return to maximal functional potential.      Patient will improve Strength to stated goals of appropriate musculature in order to improve functional independence.      Pain Rating at Best: 1/10 to improve Quality of Life.               Patient will meet predicted functional outcome (FOTO) score: 10% to increase self-worth & perceived functional ability.      Plan     Continue with appropriate POC    Jing Barnett, PTA  9/2/2022

## 2022-09-06 ENCOUNTER — CLINICAL SUPPORT (OUTPATIENT)
Dept: REHABILITATION | Facility: HOSPITAL | Age: 82
End: 2022-09-06
Payer: MEDICARE

## 2022-09-06 DIAGNOSIS — M54.50 LOW BACK PAIN, NON-SPECIFIC: ICD-10-CM

## 2022-09-06 DIAGNOSIS — R29.898 LEG WEAKNESS, BILATERAL: Primary | ICD-10-CM

## 2022-09-06 PROCEDURE — 97112 NEUROMUSCULAR REEDUCATION: CPT | Mod: CQ

## 2022-09-06 PROCEDURE — 97110 THERAPEUTIC EXERCISES: CPT | Mod: CQ

## 2022-09-06 NOTE — PROGRESS NOTES
"  Physical Therapy Treatment Note     Name: Zaira Garcia  Clinic Number: [id number]    Therapy Diagnosis:   Encounter Diagnoses   Name Primary?    Leg weakness, bilateral Yes    Low back pain, non-specific        Physician: Ellie Dominguez II, *    Visit Date: 9/6/2022    Physician Orders: PT Eval and Treat   Medical Diagnosis from Referral: Bilateral leg weakness [R29.898]  Evaluation Date: 8/23/2022  Authorization Period Expiration: 2023  Plan of Care Expiration: 10/18/2022    Visit # / Visits authorized: 5/17  PTA Visit #: 2    Time In: 0845 (Pt tx started prior to being checked in)  Time Out: 0915  Total Billable Time: 30 minutes    Precautions: Standard, Diabetes, and Fall    Subjective     Pt reports: "about the same"  He was compliant with home exercise program.    Pain: 5/10  Location: bilateral lower legs     Objective     Rolando received therapeutic exercises to develop strength for 20 minutes including:  Nu step x 6 min  QS 3 x 10  SAQ 3 x 10  SLR 2 x 10  Bridges 2 x 10  Hip abduction 3 x 10  Hip adduction 3 x 10  Sit to stands x 10    Rolando participated in neuromuscular re-education activities to improve: Balance for 10 minutes. The following activities were included:  Standing marching 3 x 10  Three way hip 2 x 10    Home Exercises Provided and Patient Education Provided     Education provided: no new exercises added today    Written Home Exercises Provided: Patient instructed to cont prior HEP.  Exercises were reviewed and Rolando was able to demonstrate them prior to the end of the session.  Rolando demonstrated good  understanding of the education provided.     See EMR under Patient Instructions for exercises provided prior visit.    Assessment     Patient had no new complaints following treatment today. Patient able to perform all exercises with no increase in pain.  Rolando Is progressing well towards his goals.   Pt prognosis is Good.     Pt will continue to benefit from skilled outpatient " physical therapy to address the deficits listed in the problem list box on initial evaluation, provide pt/family education and to maximize pt's level of independence in the home and community environment.     Pt's spiritual, cultural and educational needs considered and pt agreeable to plan of care and goals.     Anticipated barriers to physical therapy: chronic, age, corborbidies : advanced age, diabetes, high BMI and HTN , prior therapy     Goals:    SHORT TERM GOALS: 4 weeks     Recent signs and systems trend is improving in order to progress towards LTG's.     Patient will be independent with HEP in order to further progress and return to maximal function.     Patient will improve AROM to 50% of stated goals, listed in objective measures above, in order to progress towards independence with functional activities.      Pain rating at Worst: 5/10 in order to progress towards increased independence with activity.     Patient will be able to correct postural deviations in sitting and standing, to decrease pain and promote postural awareness for injury prevention.         LONG TERM GOALS: 8 weeks     Patient will return to normal ADL, recreational, and work related activities with less pain and limitation.      Patient will improve AROM to stated goals in order to return to maximal functional potential.      Patient will improve Strength to stated goals of appropriate musculature in order to improve functional independence.      Pain Rating at Best: 1/10 to improve Quality of Life.               Patient will meet predicted functional outcome (FOTO) score: 10% to increase self-worth & perceived functional ability.      Plan     Continue with appropriate POC    Ronak Otoole, PTA  9/6/2022

## 2022-09-08 ENCOUNTER — CLINICAL SUPPORT (OUTPATIENT)
Dept: REHABILITATION | Facility: HOSPITAL | Age: 82
End: 2022-09-08
Payer: MEDICARE

## 2022-09-08 DIAGNOSIS — R29.898 LEG WEAKNESS, BILATERAL: Primary | ICD-10-CM

## 2022-09-08 PROCEDURE — 97112 NEUROMUSCULAR REEDUCATION: CPT | Mod: CQ

## 2022-09-08 PROCEDURE — 97110 THERAPEUTIC EXERCISES: CPT | Mod: CQ

## 2022-09-08 NOTE — PROGRESS NOTES
Physical Therapy Treatment Note     Name: Zaira Garcia  Clinic Number: [id number]    Therapy Diagnosis:   Encounter Diagnosis   Name Primary?    Leg weakness, bilateral Yes       Physician: Ellie Dominguez II, *    Visit Date: 9/8/2022    Physician Orders: PT Eval and Treat   Medical Diagnosis from Referral: Bilateral leg weakness [R29.898]  Evaluation Date: 8/23/2022  Authorization Period Expiration: 2023  Plan of Care Expiration: 10/18/2022    Visit # / Visits authorized: 6/17  PTA Visit #: 3    Time In: 0843  Time Out: 0918  Total Billable Time: 35 minutes    Precautions: Standard, Diabetes, and Fall    Subjective     Pt reports: he is about the same with no new complaints  He was compliant with home exercise program.    Pain: 5/10  Location: bilateral lower legs     Objective     Rolando received therapeutic exercises to develop strength for 25 minutes including:  Nu step x 6 min  QS 3 x 10  SAQ 3 x 10  SLR 3 x 10  Bridges 3 x 10  Hip abduction with RTB 3 x 10  Hip adduction 3 x 10  Sit to stands 2 x 10    Rolando participated in neuromuscular re-education activities to improve: Balance for 10 minutes. The following activities were included:  Standing marching 3 x 10  Three way hip 2 x 10    Home Exercises Provided and Patient Education Provided     Education provided: no new exercises added today    Written Home Exercises Provided: Patient instructed to cont prior HEP.  Exercises were reviewed and Rolando was able to demonstrate them prior to the end of the session.  Rolando demonstrated good  understanding of the education provided.     See EMR under Patient Instructions for exercises provided prior visit.    Assessment     Patient had no new complaints following treatment today. Patient exhibits decreased coordination and control of BLE.  Rolando Is progressing well towards his goals.   Pt prognosis is Good.     Pt will continue to benefit from skilled outpatient physical therapy to address the deficits  listed in the problem list box on initial evaluation, provide pt/family education and to maximize pt's level of independence in the home and community environment.     Pt's spiritual, cultural and educational needs considered and pt agreeable to plan of care and goals.     Anticipated barriers to physical therapy: chronic, age, corborbidies : advanced age, diabetes, high BMI and HTN , prior therapy     Goals:    SHORT TERM GOALS: 4 weeks     Recent signs and systems trend is improving in order to progress towards LTG's.     Patient will be independent with HEP in order to further progress and return to maximal function.     Patient will improve AROM to 50% of stated goals, listed in objective measures above, in order to progress towards independence with functional activities.      Pain rating at Worst: 5/10 in order to progress towards increased independence with activity.     Patient will be able to correct postural deviations in sitting and standing, to decrease pain and promote postural awareness for injury prevention.         LONG TERM GOALS: 8 weeks     Patient will return to normal ADL, recreational, and work related activities with less pain and limitation.      Patient will improve AROM to stated goals in order to return to maximal functional potential.      Patient will improve Strength to stated goals of appropriate musculature in order to improve functional independence.      Pain Rating at Best: 1/10 to improve Quality of Life.               Patient will meet predicted functional outcome (FOTO) score: 10% to increase self-worth & perceived functional ability.      Plan     Continue with appropriate POC    Ronak Otoole, ARNULFO  9/8/2022

## 2022-09-13 ENCOUNTER — CLINICAL SUPPORT (OUTPATIENT)
Dept: REHABILITATION | Facility: HOSPITAL | Age: 82
End: 2022-09-13
Payer: MEDICARE

## 2022-09-13 DIAGNOSIS — R29.898 LEG WEAKNESS, BILATERAL: Primary | ICD-10-CM

## 2022-09-13 PROCEDURE — 97110 THERAPEUTIC EXERCISES: CPT | Mod: CQ

## 2022-09-13 NOTE — PROGRESS NOTES
"  Physical Therapy Treatment Note     Name: Zaira Garcia  Clinic Number: [id number]    Therapy Diagnosis:   No diagnosis found.      Physician: Ellie Dominguez II, *    Visit Date: 9/13/2022    Physician Orders: PT Eval and Treat   Medical Diagnosis from Referral: Bilateral leg weakness [R29.898]  Evaluation Date: 8/23/2022  Authorization Period Expiration: 2023  Plan of Care Expiration: 10/18/2022    Visit # / Visits authorized: 7/17  PTA Visit #: 4    Time In: 0839  Time Out: 0912  Total Billable Time: 33 minutes    Precautions: Standard, Diabetes, and Fall    Subjective     Pt reports: "last visit did a number on me".  He was compliant with home exercise program.    Pain: 5/10  Location: bilateral lower legs     Objective     Rolando received therapeutic exercises to develop strength for 33 minutes including:  Nu step x 6 min  QS 3 x 10  SAQ 3 x 10  SLR 3 x 10 with verbal/tactile cues for quality of movement  Bridges 3 x 10 (not today due to cramping in LLE with this exercise)  Hip abduction with RTB 3 x 10  Hip adduction 3 x 10  LAQ 2 x 10  Sit to stands 2 x 10    Rolando participated in neuromuscular re-education activities to improve: Balance and Coordination for 0 minutes. The following activities were included:  Standing marching 3 x 10 (not today)  Three way hip 2 x 10 (not today)    Home Exercises Provided and Patient Education Provided     Education provided: no new exercises added today    Written Home Exercises Provided: Patient instructed to cont prior HEP.  Exercises were reviewed and Rolando was able to demonstrate them prior to the end of the session.  Rolando demonstrated good  understanding of the education provided.     See EMR under Patient Instructions for exercises provided prior visit.    Assessment     Patient had no new complaints following treatment today. Patient requires verbal/tactile cues for quality of movement with BLE due to decreased coordination and control of BLE.    Rolando Is " progressing well towards his goals.   Pt prognosis is Good.     Pt will continue to benefit from skilled outpatient physical therapy to address the deficits listed in the problem list box on initial evaluation, provide pt/family education and to maximize pt's level of independence in the home and community environment.     Pt's spiritual, cultural and educational needs considered and pt agreeable to plan of care and goals.     Anticipated barriers to physical therapy: chronic, age, corborbidies : advanced age, diabetes, high BMI and HTN , prior therapy     Goals:    SHORT TERM GOALS: 4 weeks     Recent signs and systems trend is improving in order to progress towards LTG's.     Patient will be independent with HEP in order to further progress and return to maximal function.     Patient will improve AROM to 50% of stated goals, listed in objective measures above, in order to progress towards independence with functional activities.      Pain rating at Worst: 5/10 in order to progress towards increased independence with activity.     Patient will be able to correct postural deviations in sitting and standing, to decrease pain and promote postural awareness for injury prevention.         LONG TERM GOALS: 8 weeks     Patient will return to normal ADL, recreational, and work related activities with less pain and limitation.      Patient will improve AROM to stated goals in order to return to maximal functional potential.      Patient will improve Strength to stated goals of appropriate musculature in order to improve functional independence.      Pain Rating at Best: 1/10 to improve Quality of Life.               Patient will meet predicted functional outcome (FOTO) score: 10% to increase self-worth & perceived functional ability.      Plan     Continue with appropriate POC    Ronak Otoole, PTA  9/13/2022

## 2022-09-15 ENCOUNTER — CLINICAL SUPPORT (OUTPATIENT)
Dept: REHABILITATION | Facility: HOSPITAL | Age: 82
End: 2022-09-15
Payer: MEDICARE

## 2022-09-15 DIAGNOSIS — R29.898 LEG WEAKNESS, BILATERAL: Primary | ICD-10-CM

## 2022-09-15 PROCEDURE — 97110 THERAPEUTIC EXERCISES: CPT | Mod: CQ

## 2022-09-15 NOTE — PROGRESS NOTES
Physical Therapy Treatment Note     Name: Zaira Garcia  Clinic Number: [id number]    Therapy Diagnosis:   Encounter Diagnosis   Name Primary?    Leg weakness, bilateral Yes         Physician: Ellie Dominguez II, *    Visit Date: 9/15/2022    Physician Orders: PT Eval and Treat   Medical Diagnosis from Referral: Bilateral leg weakness [R29.898]  Evaluation Date: 8/23/2022  Authorization Period Expiration: 2023  Plan of Care Expiration: 10/18/2022    Visit # / Visits authorized: 8/17  PTA Visit #: 5    Time In: 0845  Time Out: 0923  Total Billable Time: 38 minutes    Precautions: Standard, Diabetes, and Fall    Subjective     Pt reports: no new complaints  He was compliant with home exercise program.    Pain: 4/10  Location: bilateral lower legs     Objective     Rolando received therapeutic exercises to develop strength for 38 minutes including:  Nu step x 6 min  QS 3 x 10  SAQ 3 x 10 x 1.5 lb  SLR 3 x 10 with verbal/tactile cues for quality of movement  Bridges 3 x 10  Hip abduction with GTB 3 x 10  Hip adduction 3 x 10  LAQ 3 x 10 x 1.5 lb  Sit to stands 2 x 10    Rolando participated in neuromuscular re-education activities to improve: Balance and Coordination for 0 minutes. The following activities were included:  Standing marching 3 x 10 (not today)  Three way hip 2 x 10 (not today)    Home Exercises Provided and Patient Education Provided     Education provided: no new exercises added today    Written Home Exercises Provided: Patient instructed to cont prior HEP.  Exercises were reviewed and Rolando was able to demonstrate them prior to the end of the session.  Rolando demonstrated good  understanding of the education provided.     See EMR under Patient Instructions for exercises provided prior visit.    Assessment     Patient had no new complaints following treatment today. Patient requires verbal/tactile cues for quality of movement with BLE due to decreased coordination and control of BLE.    Rolando Is  progressing well towards his goals.   Pt prognosis is Good.     Pt will continue to benefit from skilled outpatient physical therapy to address the deficits listed in the problem list box on initial evaluation, provide pt/family education and to maximize pt's level of independence in the home and community environment.     Pt's spiritual, cultural and educational needs considered and pt agreeable to plan of care and goals.     Anticipated barriers to physical therapy: chronic, age, corborbidies : advanced age, diabetes, high BMI and HTN , prior therapy     Goals:    SHORT TERM GOALS: 4 weeks     Recent signs and systems trend is improving in order to progress towards LTG's.     Patient will be independent with HEP in order to further progress and return to maximal function.     Patient will improve AROM to 50% of stated goals, listed in objective measures above, in order to progress towards independence with functional activities.      Pain rating at Worst: 5/10 in order to progress towards increased independence with activity.     Patient will be able to correct postural deviations in sitting and standing, to decrease pain and promote postural awareness for injury prevention.         LONG TERM GOALS: 8 weeks     Patient will return to normal ADL, recreational, and work related activities with less pain and limitation.      Patient will improve AROM to stated goals in order to return to maximal functional potential.      Patient will improve Strength to stated goals of appropriate musculature in order to improve functional independence.      Pain Rating at Best: 1/10 to improve Quality of Life.               Patient will meet predicted functional outcome (FOTO) score: 10% to increase self-worth & perceived functional ability.      Plan     Continue with appropriate POC    Ronak Otoole, PTA  9/15/2022

## 2022-09-20 ENCOUNTER — CLINICAL SUPPORT (OUTPATIENT)
Dept: REHABILITATION | Facility: HOSPITAL | Age: 82
End: 2022-09-20
Payer: MEDICARE

## 2022-09-20 DIAGNOSIS — M54.50 LOW BACK PAIN, NON-SPECIFIC: ICD-10-CM

## 2022-09-20 DIAGNOSIS — R29.898 LEG WEAKNESS, BILATERAL: Primary | ICD-10-CM

## 2022-09-20 PROCEDURE — 97110 THERAPEUTIC EXERCISES: CPT

## 2022-09-20 NOTE — PROGRESS NOTES
Physical Therapy Progress Note     Name: Zaira Garcia  Clinic Number: [id number]    Therapy Diagnosis:   Encounter Diagnoses   Name Primary?    Leg weakness, bilateral Yes    Low back pain, non-specific          Physician: Ellie Dominguez II, *    Visit Date: 9/20/2022    Physician Orders: PT Eval and Treat   Medical Diagnosis from Referral: Bilateral leg weakness [R29.898]  Evaluation Date: 8/23/2022  Authorization Period Expiration: 2023  Plan of Care Expiration: 10/18/2022    Visit # / Visits authorized: 8/17  PTA Visit #:     Time In: 08:50  Time Out: 0923  Total Billable Time: 33 minutes    Precautions: Standard, Diabetes, and Fall    Subjective     Pt reports: no new complaints; wife states they made an appointment with neurologist on October 16th with Dr. 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       He was compliant with home exercise program.    Pain: 3/10    Location: bilateral lower legs     Objective     Sensation:  Sensation is intact to light touch     (x = not tested due to pain and/or inability to obtain test position)     RANGE OF MOTION:     Hip AROM/PROM Right  8/23/2022 Left  8/23/2022 Goal   Hip Flexion (120) 42 40 115  Initial: 40      STRENGTH:     Hip/Knee MMT Right  8/23/2022 Goal   Hip Flexion  4-/5 5/5 B    Hip Extension  4-/5 5/5 B   Hip Abduction  4-/5 5/5 B   Knee Flexion 4-/5 5/5 B   Knee Extension 4-/5 5/5 B      Hip/Knee MMT Left  8/23/2022 Goal   Hip Flexion  3+/5 5/5 B    Hip Extension  3+/5 5/5 B   Hip Abduction  3/5 5/5 B   Knee Flexion 3+/5 5/5 B   Knee Extension 3+/5 5/5 B         Special Tests:   Test Right Left Goal   FADDIR positive positive Negative   MANUEL positive positive Negative         Muscle Flexibility: Patient presents with flexibility limitations including but not limited to; bilateral hamstring and posterior chain tightness     Posture:  Pt presents with postural abnormalities which include: forward head, rounded shoulders  and forward trunk lean      Gait Analysis: The patient ambulated with the following assistive device: standard walker; the pt presents with the following gait abnormalities: LE hip ER gait                Movement Analysis:      Rehab Tests  Outcome Norms GOAL   Five Time Sit to Stand 40  9/20: 35 second 60s: <11.4 sec  70s: <12.6 sec  80s: 14.8 sec 30   Timed Up and Go 30  9/20: 24  <13.5 sec 20   Balance:     RIGHT  8/23/2022 LEFT  8/23/2022 Goal   Closed 5 second feet together  9/20: 8 seconds x 60 seconds      Tandem x x 20 seconds      Single Leg x x 20 seconds                 Rolando received therapeutic exercises to develop strength for 38 minutes including:  Nu step x 6 min  QS 3 x 10 (not today)  SAQ 3 x 10 x 2.5 lb  SLR 3 x 10 x 2.5 LB with verbal/tactile cues for quality of movement  Bridges 3 x 10  Hip abduction with GTB 3 x 10  Hip adduction 3 x 10  LAQ 3 x 10 x 2.5 lb  Sit to stands 2 x 10    Rolando participated in neuromuscular re-education activities to improve: Balance and Coordination for 0 minutes. The following activities were included:  Standing marching 3 x 10 (not today)  Three way hip 2 x 10 (not today)    Home Exercises Provided and Patient Education Provided     Education provided: no new exercises added today    Written Home Exercises Provided: Patient instructed to cont prior HEP.  Exercises were reviewed and Rolando was able to demonstrate them prior to the end of the session.  Rolando demonstrated good  understanding of the education provided.     See EMR under Patient Instructions for exercises provided prior visit.    Assessment     Zaira is progressing well with physical therapy, with minimal complaints of pain or discomfort.  A progress note was completed today with significant improvements in strength compared to initial evaluation, please see exam for details. Zaira continues to have difficulty with strength, gait, balance due to ataxic movements in BLE.  The above impairments and functional deficits will continue  to be addressed over the course of this plan of care. Zaira was educated on continued progression of PT, expectations for the duration of care, updates on goals set at initial evaluation, and home exercise program.  Handouts were issued during today's visit with instructions on what exercise to perform up until next visit.      Rolando Is progressing well towards his goals.   Pt prognosis is Good.     Pt will continue to benefit from skilled outpatient physical therapy to address the deficits listed in the problem list box on initial evaluation, provide pt/family education and to maximize pt's level of independence in the home and community environment.     Pt's spiritual, cultural and educational needs considered and pt agreeable to plan of care and goals.     Anticipated barriers to physical therapy: chronic, age, corborbidies : advanced age, diabetes, high BMI and HTN , prior therapy     Goals:    SHORT TERM GOALS: 4 weeks     Recent signs and systems trend is improving in order to progress towards LTG's.  M   Patient will be independent with HEP in order to further progress and return to maximal function.  M   Patient will improve AROM to 50% of stated goals, listed in objective measures above, in order to progress towards independence with functional activities.   M   Pain rating at Worst: 5/10 in order to progress towards increased independence with activity.  M   Patient will be able to correct postural deviations in sitting and standing, to decrease pain and promote postural awareness for injury prevention.   M      LONG TERM GOALS: 8 weeks     Patient will return to normal ADL, recreational, and work related activities with less pain and limitation.  PC    Patient will improve AROM to stated goals in order to return to maximal functional potential.   PC   Patient will improve Strength to stated goals of appropriate musculature in order to improve functional independence.   PC   Pain Rating at Best: 1/10 to  improve Quality of Life.            PC   Patient will meet predicted functional outcome (FOTO) score: 10% to increase self-worth & perceived functional ability. PC     Plan     Continue with appropriate POC    Zuleima Sunshine, PT  9/20/2022

## 2022-09-22 ENCOUNTER — CLINICAL SUPPORT (OUTPATIENT)
Dept: REHABILITATION | Facility: HOSPITAL | Age: 82
End: 2022-09-22
Payer: MEDICARE

## 2022-09-22 DIAGNOSIS — R29.898 LEG WEAKNESS, BILATERAL: Primary | ICD-10-CM

## 2022-09-22 PROCEDURE — 97110 THERAPEUTIC EXERCISES: CPT | Mod: CQ

## 2022-09-22 NOTE — PROGRESS NOTES
Physical Therapy Progress Note     Name: Zaira Garcia  Clinic Number: [id number]    Therapy Diagnosis:   Encounter Diagnosis   Name Primary?    Leg weakness, bilateral Yes         Physician: Ellie Dominguez II, *    Visit Date: 9/22/2022    Physician Orders: PT Eval and Treat   Medical Diagnosis from Referral: Bilateral leg weakness [R29.898]  Evaluation Date: 8/23/2022  Authorization Period Expiration: 2023  Plan of Care Expiration: 10/18/2022    Visit # / Visits authorized: 10/17  PTA Visit #: 1    Time In: 0839  Time Out: 0909  Total Billable Time: 30 minutes    Precautions: Standard, Diabetes, and Fall    Subjective     Pt reports: he was cutting grass on his tractor for 6 hours yesterday and is pretty sore and weak from that.     He was compliant with home exercise program.    Pain: 3/10    Location: bilateral lower legs     Objective     Rolando received therapeutic exercises to develop strength for 30 minutes including:  Nu step x 6 min  QS 3 x 10 (not today)  SAQ 3 x 10 x 2.5 lb  SLR 3 x 10 x 2.5 LB with verbal/tactile cues for quality of movement  Bridges 3 x 10  Hip abduction with GTB 3 x 10  Hip adduction 3 x 10  LAQ 3 x 10 x 2.5 lb  Sit to stands 2 x 10    Rolando participated in neuromuscular re-education activities to improve: Balance and Coordination for 0 minutes. The following activities were included:  Standing marching 3 x 10 (not today)  Three way hip 2 x 10 (not today)    Home Exercises Provided and Patient Education Provided     Education provided: no new exercises added today    Written Home Exercises Provided: Patient instructed to cont prior HEP.  Exercises were reviewed and Rolando was able to demonstrate them prior to the end of the session.  Rolando demonstrated good  understanding of the education provided.     See EMR under Patient Instructions for exercises provided prior visit.    Assessment     Patient able to perform all exercises with no complaint. Patient continues to have most  difficulty with eccentric muscle control and coordination of BLECarmen Marshall Is progressing well towards his goals.   Pt prognosis is Good.     Pt will continue to benefit from skilled outpatient physical therapy to address the deficits listed in the problem list box on initial evaluation, provide pt/family education and to maximize pt's level of independence in the home and community environment.     Pt's spiritual, cultural and educational needs considered and pt agreeable to plan of care and goals.     Anticipated barriers to physical therapy: chronic, age, corborbidies : advanced age, diabetes, high BMI and HTN , prior therapy     Goals:    SHORT TERM GOALS: 4 weeks     Recent signs and systems trend is improving in order to progress towards LTG's.  M   Patient will be independent with HEP in order to further progress and return to maximal function.  M   Patient will improve AROM to 50% of stated goals, listed in objective measures above, in order to progress towards independence with functional activities.   M   Pain rating at Worst: 5/10 in order to progress towards increased independence with activity.  M   Patient will be able to correct postural deviations in sitting and standing, to decrease pain and promote postural awareness for injury prevention.   M      LONG TERM GOALS: 8 weeks     Patient will return to normal ADL, recreational, and work related activities with less pain and limitation.  PC    Patient will improve AROM to stated goals in order to return to maximal functional potential.   PC   Patient will improve Strength to stated goals of appropriate musculature in order to improve functional independence.   PC   Pain Rating at Best: 1/10 to improve Quality of Life.            PC   Patient will meet predicted functional outcome (FOTO) score: 10% to increase self-worth & perceived functional ability. PC     Plan     Continue with appropriate POC    Ronak Otoole, PTA  9/22/2022

## 2022-09-27 ENCOUNTER — CLINICAL SUPPORT (OUTPATIENT)
Dept: REHABILITATION | Facility: HOSPITAL | Age: 82
End: 2022-09-27
Payer: MEDICARE

## 2022-09-27 DIAGNOSIS — R29.898 LEG WEAKNESS, BILATERAL: Primary | ICD-10-CM

## 2022-09-27 PROCEDURE — 97110 THERAPEUTIC EXERCISES: CPT | Mod: CQ

## 2022-09-27 NOTE — PROGRESS NOTES
"  Physical Therapy Progress Note     Name: Zaira Garcia  Clinic Number: [id number]    Therapy Diagnosis:   Encounter Diagnosis   Name Primary?    Leg weakness, bilateral Yes         Physician: Ellie Dominguez II, *    Visit Date: 9/27/2022    Physician Orders: PT Eval and Treat   Medical Diagnosis from Referral: Bilateral leg weakness [R29.898]  Evaluation Date: 8/23/2022  Authorization Period Expiration: 2023  Plan of Care Expiration: 10/18/2022    Visit # / Visits authorized: 11/17  PTA Visit #: 2    Time In: 0842  Time Out: 0915  Total Billable Time: 33 minutes    Precautions: Standard, Diabetes, and Fall    Subjective     Pt reports: he is "making it" today     He was compliant with home exercise program.    Pain: 3/10    Location: bilateral lower legs     Objective     Rolando received therapeutic exercises to develop strength for 33 minutes including:  Nu step x 7 min  QS 3 x 10 (not today)  SAQ 3 x 10 x 3 lb  SLR 3 x 10 x 2.5 LB with verbal/tactile cues for quality of movement  Bridges 3 x 10  Hip abduction with GTB 3 x 10  Hip adduction 3 x 10  LAQ 3 x 10 x 3 LB  Sit to stands 3 x 10    Rolando participated in neuromuscular re-education activities to improve: Balance and Coordination for 0 minutes. The following activities were included:  Standing marching 3 x 10 verbal cues for eccentric control (not today)  Three way hip 2 x 10 (not today)    Home Exercises Provided and Patient Education Provided     Education provided: no new exercises added today    Written Home Exercises Provided: Patient instructed to cont prior HEP.  Exercises were reviewed and Rolando was able to demonstrate them prior to the end of the session.  Rolando demonstrated good  understanding of the education provided.     See EMR under Patient Instructions for exercises provided prior visit.    Assessment     Patient able to tolerate added repetitions to exercises with no complaint. Patient continues to have most difficulty with " eccentric muscle control and coordination of SINAN Marshall Is progressing well towards his goals.   Pt prognosis is Good.     Pt will continue to benefit from skilled outpatient physical therapy to address the deficits listed in the problem list box on initial evaluation, provide pt/family education and to maximize pt's level of independence in the home and community environment.     Pt's spiritual, cultural and educational needs considered and pt agreeable to plan of care and goals.     Anticipated barriers to physical therapy: chronic, age, corborbidies : advanced age, diabetes, high BMI and HTN , prior therapy     Goals:    SHORT TERM GOALS: 4 weeks     Recent signs and systems trend is improving in order to progress towards LTG's.  M   Patient will be independent with HEP in order to further progress and return to maximal function.  M   Patient will improve AROM to 50% of stated goals, listed in objective measures above, in order to progress towards independence with functional activities.   M   Pain rating at Worst: 5/10 in order to progress towards increased independence with activity.  M   Patient will be able to correct postural deviations in sitting and standing, to decrease pain and promote postural awareness for injury prevention.   M      LONG TERM GOALS: 8 weeks     Patient will return to normal ADL, recreational, and work related activities with less pain and limitation.  PC    Patient will improve AROM to stated goals in order to return to maximal functional potential.   PC   Patient will improve Strength to stated goals of appropriate musculature in order to improve functional independence.   PC   Pain Rating at Best: 1/10 to improve Quality of Life.            PC   Patient will meet predicted functional outcome (FOTO) score: 10% to increase self-worth & perceived functional ability. PC     Plan     Continue with appropriate POC    Ronak Otoole, PTA  9/27/2022

## 2022-09-29 ENCOUNTER — CLINICAL SUPPORT (OUTPATIENT)
Dept: REHABILITATION | Facility: HOSPITAL | Age: 82
End: 2022-09-29
Payer: MEDICARE

## 2022-09-29 DIAGNOSIS — R29.898 LEG WEAKNESS, BILATERAL: Primary | ICD-10-CM

## 2022-09-29 PROCEDURE — 97110 THERAPEUTIC EXERCISES: CPT | Mod: CQ

## 2022-09-29 NOTE — PROGRESS NOTES
Physical Therapy Progress Note     Name: Zaira Garcia  Clinic Number: [id number]    Therapy Diagnosis:   Encounter Diagnosis   Name Primary?    Leg weakness, bilateral Yes         Physician: Ellie Dominguez II, *    Visit Date: 9/29/2022    Physician Orders: PT Eval and Treat   Medical Diagnosis from Referral: Bilateral leg weakness [R29.898]  Evaluation Date: 8/23/2022  Authorization Period Expiration: 2023  Plan of Care Expiration: 10/18/2022    Visit # / Visits authorized: 12/17  PTA Visit #: 3    Time In: 0840  Time Out: 0912  Total Billable Time: 32 minutes    Precautions: Standard, Diabetes, and Fall    Subjective     Pt reports: he is having one of his bad days, having difficulty with his balance this morning    He was compliant with home exercise program.    Pain: 3/10    Location: bilateral lower legs     Objective     Rolando received therapeutic exercises to develop strength for 32 minutes including:  Nu step x 7 min  QS 3 x 10 (not today)  SAQ 3 x 10 x 3 lb  SLR 2 x 10 x 3 LB with verbal/tactile cues for quality of movement  Bridges 3 x 10  Hip abduction with GTB 3 x 10  Hip adduction 3 x 10  LAQ 3 x 10 x 3 LB  Sit to stands 3 x 10    Rolando participated in neuromuscular re-education activities to improve: Balance and Coordination for 0 minutes. The following activities were included:  Standing marching 3 x 10 verbal cues for eccentric control (not today)  Three way hip 2 x 10 (not today)    Home Exercises Provided and Patient Education Provided     Education provided: no new exercises added today    Written Home Exercises Provided: Patient instructed to cont prior HEP.  Exercises were reviewed and Rolando was able to demonstrate them prior to the end of the session.  Rolando demonstrated good  understanding of the education provided.     See EMR under Patient Instructions for exercises provided prior visit.    Assessment     Patient able to tolerate added repetitions to exercises with no complaint  or increase in pain. Patient continues to have most difficulty with eccentric muscle control and coordination of BLE.    Rolando Is progressing well towards his goals.   Pt prognosis is Good.     Pt will continue to benefit from skilled outpatient physical therapy to address the deficits listed in the problem list box on initial evaluation, provide pt/family education and to maximize pt's level of independence in the home and community environment.     Pt's spiritual, cultural and educational needs considered and pt agreeable to plan of care and goals.     Anticipated barriers to physical therapy: chronic, age, corborbidies : advanced age, diabetes, high BMI and HTN , prior therapy     Goals:    SHORT TERM GOALS: 4 weeks     Recent signs and systems trend is improving in order to progress towards LTG's.  M   Patient will be independent with HEP in order to further progress and return to maximal function.  M   Patient will improve AROM to 50% of stated goals, listed in objective measures above, in order to progress towards independence with functional activities.   M   Pain rating at Worst: 5/10 in order to progress towards increased independence with activity.  M   Patient will be able to correct postural deviations in sitting and standing, to decrease pain and promote postural awareness for injury prevention.   M      LONG TERM GOALS: 8 weeks     Patient will return to normal ADL, recreational, and work related activities with less pain and limitation.  PC    Patient will improve AROM to stated goals in order to return to maximal functional potential.   PC   Patient will improve Strength to stated goals of appropriate musculature in order to improve functional independence.   PC   Pain Rating at Best: 1/10 to improve Quality of Life.            PC   Patient will meet predicted functional outcome (FOTO) score: 10% to increase self-worth & perceived functional ability. PC     Plan     Continue with appropriate  BENEDICT Otoole, PTA  9/29/2022

## 2022-10-04 ENCOUNTER — CLINICAL SUPPORT (OUTPATIENT)
Dept: REHABILITATION | Facility: HOSPITAL | Age: 82
End: 2022-10-04
Payer: MEDICARE

## 2022-10-04 DIAGNOSIS — R29.898 LEG WEAKNESS, BILATERAL: Primary | ICD-10-CM

## 2022-10-04 PROCEDURE — 97110 THERAPEUTIC EXERCISES: CPT | Mod: CQ

## 2022-10-04 NOTE — PROGRESS NOTES
Physical Therapy Progress Note     Name: Zaira Garcia  Clinic Number: [id number]    Therapy Diagnosis:   Encounter Diagnosis   Name Primary?    Leg weakness, bilateral Yes         Physician: Ellie Dominguez II, *    Visit Date: 10/4/2022    Physician Orders: PT Eval and Treat   Medical Diagnosis from Referral: Bilateral leg weakness [R29.898]  Evaluation Date: 8/23/2022  Authorization Period Expiration: 2023  Plan of Care Expiration: 10/18/2022    Visit # / Visits authorized: 13/17  PTA Visit #: 4    Time In: 0847  Time Out: 0920  Total Billable Time: 33 minutes    Precautions: Standard, Diabetes, and Fall    Subjective     Pt reports: he is doing good today with no complaints    He was compliant with home exercise program.    Pain: 3/10    Location: bilateral lower legs     Objective     Rolando received therapeutic exercises to develop strength for 33 minutes including:  Nu step x 7 min  QS 3 x 10 (not today)  SAQ 3 x 10 x 3 lb  SLR 2 x 10 x 3 LB with verbal/tactile cues for quality of movement  Bridges 3 x 10  Hip abduction with BTB 3 x 10  Hip adduction 3 x 10  LAQ 3 x 10 x 3 LB  Sit to stands 3 x 10  Standing marching 3 x 10 verbal cues for eccentric control      Home Exercises Provided and Patient Education Provided     Education provided: no new exercises added today    Written Home Exercises Provided: Patient instructed to cont prior HEP.  Exercises were reviewed and Rolando was able to demonstrate them prior to the end of the session.  Rolando demonstrated good  understanding of the education provided.     See EMR under Patient Instructions for exercises provided prior visit.    Assessment     Patient able to tolerate added repetitions to exercises with no complaint or increase in pain.    Rolando Is progressing well towards his goals.   Pt prognosis is Good.     Pt will continue to benefit from skilled outpatient physical therapy to address the deficits listed in the problem list box on initial  evaluation, provide pt/family education and to maximize pt's level of independence in the home and community environment.     Pt's spiritual, cultural and educational needs considered and pt agreeable to plan of care and goals.     Anticipated barriers to physical therapy: chronic, age, corborbidies : advanced age, diabetes, high BMI and HTN , prior therapy     Goals:    SHORT TERM GOALS: 4 weeks     Recent signs and systems trend is improving in order to progress towards LTG's.  M   Patient will be independent with HEP in order to further progress and return to maximal function.  M   Patient will improve AROM to 50% of stated goals, listed in objective measures above, in order to progress towards independence with functional activities.   M   Pain rating at Worst: 5/10 in order to progress towards increased independence with activity.  M   Patient will be able to correct postural deviations in sitting and standing, to decrease pain and promote postural awareness for injury prevention.   M      LONG TERM GOALS: 8 weeks     Patient will return to normal ADL, recreational, and work related activities with less pain and limitation.  PC    Patient will improve AROM to stated goals in order to return to maximal functional potential.   PC   Patient will improve Strength to stated goals of appropriate musculature in order to improve functional independence.   PC   Pain Rating at Best: 1/10 to improve Quality of Life.            PC   Patient will meet predicted functional outcome (FOTO) score: 10% to increase self-worth & perceived functional ability. PC     Plan     Continue with appropriate POC    Ronak Otoole, ARNULFO  10/4/2022

## 2022-10-11 ENCOUNTER — CLINICAL SUPPORT (OUTPATIENT)
Dept: REHABILITATION | Facility: HOSPITAL | Age: 82
End: 2022-10-11
Payer: MEDICARE

## 2022-10-11 DIAGNOSIS — M54.50 LOW BACK PAIN, NON-SPECIFIC: ICD-10-CM

## 2022-10-11 DIAGNOSIS — R29.898 LEG WEAKNESS, BILATERAL: Primary | ICD-10-CM

## 2022-10-11 PROCEDURE — 97110 THERAPEUTIC EXERCISES: CPT

## 2022-10-11 NOTE — PROGRESS NOTES
Physical Therapy Progress Note     Name: Zaira Garcia  Clinic Number: [id number]    Therapy Diagnosis:   Encounter Diagnoses   Name Primary?    Leg weakness, bilateral Yes    Low back pain, non-specific        Physician: Ellie Dominguez II, *    Visit Date: 10/11/2022    Physician Orders: PT Eval and Treat   Medical Diagnosis from Referral: Bilateral leg weakness [R29.898]  Evaluation Date: 8/23/2022  Authorization Period Expiration: 2023  Plan of Care Expiration: 10/18/2022    Visit # / Visits authorized: 14/17  PTA Visit #: 4    Time In: 08:38 AM  Time Out: 09:06 AM  Total Billable Time: 28 minutes    Precautions: Standard, Diabetes, and Fall    Subjective     Pt reports: he is doing good today with no complaints    He was compliant with home exercise program.    Pain: 3/10    Location: bilateral lower legs     Objective     Rolando received therapeutic exercises to develop strength for 33 minutes including:  Nu step x 6 min  QS 3 x 10 (not today)  SAQ 3 x 10 x 4 lb  SLR 3 x 10  with verbal/tactile cues for quality of movement  Bridges 3 x 10  Hip abduction with BTB 3 x 10  Hip adduction 3 x 10  LAQ 3 x 10 x 3 LB  Sit to stands 3 x 10  Standing marching 3 x 10 verbal cues for eccentric control      Home Exercises Provided and Patient Education Provided     Education provided: no new exercises added today    Written Home Exercises Provided: Patient instructed to cont prior HEP.  Exercises were reviewed and Rolando was able to demonstrate them prior to the end of the session.  Rolando demonstrated good  understanding of the education provided.     See EMR under Patient Instructions for exercises provided prior visit.    Assessment     Rolando Garcia tolerated PT session well with no  complaints of pain or discomfort. Objective findings are improving with measurements of functional mobility.  Therapy exercises were reviewed by revisiting exercises given from previous home exercise program.  Handouts were  issued during today's visit. Zaira demonstrated good understanding of new exercises and will continue to progress at home until next follow-up.       Rolando Is progressing well towards his goals.   Pt prognosis is Good.     Pt will continue to benefit from skilled outpatient physical therapy to address the deficits listed in the problem list box on initial evaluation, provide pt/family education and to maximize pt's level of independence in the home and community environment.     Pt's spiritual, cultural and educational needs considered and pt agreeable to plan of care and goals.     Anticipated barriers to physical therapy: chronic, age, corborbidies : advanced age, diabetes, high BMI and HTN , prior therapy     Goals:    SHORT TERM GOALS: 4 weeks     Recent signs and systems trend is improving in order to progress towards LTG's.  M   Patient will be independent with HEP in order to further progress and return to maximal function.  M   Patient will improve AROM to 50% of stated goals, listed in objective measures above, in order to progress towards independence with functional activities.   M   Pain rating at Worst: 5/10 in order to progress towards increased independence with activity.  M   Patient will be able to correct postural deviations in sitting and standing, to decrease pain and promote postural awareness for injury prevention.   M      LONG TERM GOALS: 8 weeks     Patient will return to normal ADL, recreational, and work related activities with less pain and limitation.  PC    Patient will improve AROM to stated goals in order to return to maximal functional potential.   PC   Patient will improve Strength to stated goals of appropriate musculature in order to improve functional independence.   PC   Pain Rating at Best: 1/10 to improve Quality of Life.            PC   Patient will meet predicted functional outcome (FOTO) score: 10% to increase self-worth & perceived functional ability. PC     Plan      Continue with appropriate POC    Zuleima Sunshine, PT  10/11/2022                              Griseofulvin Counseling:  I discussed with the patient the risks of griseofulvin including but not limited to photosensitivity, cytopenia, liver damage, nausea/vomiting and severe allergy.  The patient understands that this medication is best absorbed when taken with a fatty meal (e.g., ice cream or french fries).

## 2022-10-13 ENCOUNTER — CLINICAL SUPPORT (OUTPATIENT)
Dept: REHABILITATION | Facility: HOSPITAL | Age: 82
End: 2022-10-13
Payer: MEDICARE

## 2022-10-13 DIAGNOSIS — R29.898 LEG WEAKNESS, BILATERAL: Primary | ICD-10-CM

## 2022-10-13 PROCEDURE — 97110 THERAPEUTIC EXERCISES: CPT | Mod: CQ

## 2022-10-13 NOTE — PROGRESS NOTES
Physical Therapy Progress Note     Name: Zaira Garcia  Clinic Number: [id number]    Therapy Diagnosis:   Encounter Diagnosis   Name Primary?    Leg weakness, bilateral Yes       Physician: Ellie Dominguez II, *    Visit Date: 10/13/2022    Physician Orders: PT Eval and Treat   Medical Diagnosis from Referral: Bilateral leg weakness [R29.898]  Evaluation Date: 8/23/2022  Authorization Period Expiration: 2023  Plan of Care Expiration: 10/18/2022    Visit # / Visits authorized: 15/17  PTA Visit #: 1    Time In: 0837  Time Out: 0907  Total Billable Time: 30 minutes    Precautions: Standard, Diabetes, and Fall    Subjective     Pt reports: he feel last night at 2 AM while trying to get to the bathroom. Patient stated he feels like he strained a muscle in his inner right thigh. Patient able to ambulate using AD to therapy as usual.    He was compliant with home exercise program.    Pain: 5 /10    Location: bilateral lower legs     Objective     Rolando received therapeutic exercises to develop strength for 30 minutes including:  Nu step x 7 min  QS 3 x 10 (not today)  SAQ 3 x 10 x 4 lb  SLR 3 x 10  with verbal/tactile cues for quality of movement  Bridges 3 x 10  Hip abduction with BTB 3 x 10  Hip adduction 3 x 10  LAQ 3 x 10 x 3 LB  Sit to stands 3 x 10  Standing marching 3 x 10 verbal cues for eccentric control (not today)      Home Exercises Provided and Patient Education Provided     Education provided: no new exercises added today    Written Home Exercises Provided: Patient instructed to cont prior HEP.  Exercises were reviewed and Rolando was able to demonstrate them prior to the end of the session.  Rolando demonstrated good  understanding of the education provided.     See EMR under Patient Instructions for exercises provided prior visit.    Assessment     Rolando Garcia tolerated PT session well with no  complaints of pain or discomfort. Objective findings are improving with measurements of functional  mobility.  Therapy exercises were reviewed by revisiting exercises given from previous home exercise program.  Handouts were not issued during today's visit. Zaira demonstrated good understanding of new exercises and will continue to progress at home until next follow-up.       Rolando Is progressing well towards his goals.   Pt prognosis is Good.     Pt will continue to benefit from skilled outpatient physical therapy to address the deficits listed in the problem list box on initial evaluation, provide pt/family education and to maximize pt's level of independence in the home and community environment.     Pt's spiritual, cultural and educational needs considered and pt agreeable to plan of care and goals.     Anticipated barriers to physical therapy: chronic, age, corborbidies : advanced age, diabetes, high BMI and HTN , prior therapy     Goals:    SHORT TERM GOALS: 4 weeks     Recent signs and systems trend is improving in order to progress towards LTG's.  M   Patient will be independent with HEP in order to further progress and return to maximal function.  M   Patient will improve AROM to 50% of stated goals, listed in objective measures above, in order to progress towards independence with functional activities.   M   Pain rating at Worst: 5/10 in order to progress towards increased independence with activity.  M   Patient will be able to correct postural deviations in sitting and standing, to decrease pain and promote postural awareness for injury prevention.   M      LONG TERM GOALS: 8 weeks     Patient will return to normal ADL, recreational, and work related activities with less pain and limitation.  PC    Patient will improve AROM to stated goals in order to return to maximal functional potential.   PC   Patient will improve Strength to stated goals of appropriate musculature in order to improve functional independence.   PC   Pain Rating at Best: 1/10 to improve Quality of Life.            PC   Patient will  meet predicted functional outcome (FOTO) score: 10% to increase self-worth & perceived functional ability. PC     Plan     Continue with appropriate POC    Ronak Otoole PTA  10/13/2022

## 2022-10-18 ENCOUNTER — CLINICAL SUPPORT (OUTPATIENT)
Dept: REHABILITATION | Facility: HOSPITAL | Age: 82
End: 2022-10-18
Payer: MEDICARE

## 2022-10-18 DIAGNOSIS — R29.898 LEG WEAKNESS, BILATERAL: Primary | ICD-10-CM

## 2022-10-18 PROCEDURE — 97110 THERAPEUTIC EXERCISES: CPT | Mod: CQ

## 2022-10-18 NOTE — PROGRESS NOTES
Physical Therapy Progress Note     Name: Zaira Garcia  Clinic Number: [id number]    Therapy Diagnosis:   Encounter Diagnosis   Name Primary?    Leg weakness, bilateral Yes       Physician: Ellie Dominguez II, *    Visit Date: 10/18/2022    Physician Orders: PT Eval and Treat   Medical Diagnosis from Referral: Bilateral leg weakness [R29.898]  Evaluation Date: 8/23/2022  Authorization Period Expiration: 2023  Plan of Care Expiration: 10/18/2022    Visit # / Visits authorized: 16/17  PTA Visit #: 2    Time In: 0834  Time Out: 0908  Total Billable Time: 34 minutes    Precautions: Standard, Diabetes, and Fall    Subjective     Pt reports: he feels okay this morning    He was compliant with home exercise program.    Pain: 3 /10    Location: bilateral lower legs     Objective     Rolando received therapeutic exercises to develop strength for 34 minutes including:  Nu step x 7 min  QS 3 x 10 (not today)  SAQ 3 x 10 x 4 lb  SLR 3 x 10  with verbal/tactile cues for quality of movement  Bridges 3 x 10  Hip abduction with BTB 3 x 10  Hip adduction 3 x 10  LAQ 3 x 10 x 3 LB  Sit to stands 3 x 10  Standing marching 3 x 10 verbal cues for eccentric control (not today)      Home Exercises Provided and Patient Education Provided     Education provided: updated HEP    Written Home Exercises Provided: yes.  Exercises were reviewed and Rolando was able to demonstrate them prior to the end of the session.  Rolando demonstrated good  understanding of the education provided.     See EMR under Patient Instructions for exercises provided  10/18/2022 .    Assessment     Rolando Garcia tolerated PT session well with no  complaints of pain or discomfort. Patient to d/c today with updated HEP.    Rolando Is progressing well towards his goals.   Pt prognosis is Good.     Pt will continue to benefit from skilled outpatient physical therapy to address the deficits listed in the problem list box on initial evaluation, provide pt/family  education and to maximize pt's level of independence in the home and community environment.     Pt's spiritual, cultural and educational needs considered and pt agreeable to plan of care and goals.     Anticipated barriers to physical therapy: chronic, age, corborbidies : advanced age, diabetes, high BMI and HTN , prior therapy     Goals:    SHORT TERM GOALS: 4 weeks     Recent signs and systems trend is improving in order to progress towards LTG's.  M   Patient will be independent with HEP in order to further progress and return to maximal function.  M   Patient will improve AROM to 50% of stated goals, listed in objective measures above, in order to progress towards independence with functional activities.   M   Pain rating at Worst: 5/10 in order to progress towards increased independence with activity.  M   Patient will be able to correct postural deviations in sitting and standing, to decrease pain and promote postural awareness for injury prevention.   M      LONG TERM GOALS: 8 weeks     Patient will return to normal ADL, recreational, and work related activities with less pain and limitation.  M    Patient will improve AROM to stated goals in order to return to maximal functional potential.   PC   Patient will improve Strength to stated goals of appropriate musculature in order to improve functional independence.   M   Pain Rating at Best: 1/10 to improve Quality of Life.            PC   Patient will meet predicted functional outcome (FOTO) score: 10% to increase self-worth & perceived functional ability. PC     Plan     Patient to d/c with update JUAN Otoole PTA  10/18/2022

## 2022-11-16 NOTE — ASSESSMENT & PLAN NOTE
Progressive ataxia and sensory loss with normal EMG. Difficult case.   Query paraneoplastic syndrome - will check today  Query degenerative/genetic condition - will repeat brain imaging and LS spine imaging today   normal gait and station , no tenderness or deformities present

## 2023-01-10 ENCOUNTER — HOSPITAL ENCOUNTER (OUTPATIENT)
Dept: RADIOLOGY | Facility: HOSPITAL | Age: 83
Discharge: HOME | End: 2023-01-10
Attending: FAMILY MEDICINE
Payer: MEDICARE

## 2023-01-10 DIAGNOSIS — J06.9 ACUTE UPPER RESPIRATORY INFECTION: Primary | ICD-10-CM

## 2023-01-10 DIAGNOSIS — J06.9 ACUTE UPPER RESPIRATORY INFECTION: ICD-10-CM

## 2023-01-10 PROCEDURE — 71046 X-RAY EXAM CHEST 2 VIEWS: CPT | Mod: TC

## 2023-05-16 VITALS — WEIGHT: 220 LBS | BODY MASS INDEX: 31.57 KG/M2

## 2023-05-16 DIAGNOSIS — G61.81 CHRONIC INFLAMMATORY DEMYELINATING POLYNEURITIS: ICD-10-CM

## 2023-05-16 RX ORDER — EPINEPHRINE 0.3 MG/.3ML
0.3 INJECTION SUBCUTANEOUS ONCE AS NEEDED
OUTPATIENT
Start: 2023-05-17

## 2023-05-16 RX ORDER — DIPHENHYDRAMINE HYDROCHLORIDE 50 MG/ML
50 INJECTION INTRAMUSCULAR; INTRAVENOUS ONCE AS NEEDED
OUTPATIENT
Start: 2023-05-17

## 2023-05-16 RX ORDER — SODIUM CHLORIDE 0.9 % (FLUSH) 0.9 %
10 SYRINGE (ML) INJECTION
OUTPATIENT
Start: 2023-05-17

## 2023-05-16 RX ORDER — HEPARIN 100 UNIT/ML
500 SYRINGE INTRAVENOUS
OUTPATIENT
Start: 2023-05-17

## 2023-05-16 RX ORDER — DIPHENHYDRAMINE HYDROCHLORIDE 50 MG/ML
25 INJECTION INTRAMUSCULAR; INTRAVENOUS
OUTPATIENT
Start: 2023-05-17

## 2023-05-16 RX ORDER — ACETAMINOPHEN 500 MG
500 TABLET ORAL
OUTPATIENT
Start: 2023-05-17

## 2023-05-16 RX ORDER — DIPHENHYDRAMINE HCL 25 MG
25 CAPSULE ORAL
OUTPATIENT
Start: 2023-05-17

## 2023-08-25 DIAGNOSIS — M54.50 LOW BACK PAIN: ICD-10-CM

## 2023-08-25 DIAGNOSIS — R27.0 ATAXIA: Primary | ICD-10-CM

## 2023-08-25 DIAGNOSIS — G61.81 CHRONIC INFLAMMATORY DEMYELINATING POLYRADICULONEUROPATHY: ICD-10-CM

## 2023-08-30 ENCOUNTER — CLINICAL SUPPORT (OUTPATIENT)
Dept: REHABILITATION | Facility: HOSPITAL | Age: 83
End: 2023-08-30
Attending: SPECIALIST
Payer: MEDICARE

## 2023-08-30 DIAGNOSIS — R27.0 ATAXIA: ICD-10-CM

## 2023-08-30 DIAGNOSIS — G61.81 CHRONIC INFLAMMATORY DEMYELINATING POLYRADICULONEUROPATHY: ICD-10-CM

## 2023-08-30 DIAGNOSIS — M54.50 LOW BACK PAIN: ICD-10-CM

## 2023-08-30 PROCEDURE — 97162 PT EVAL MOD COMPLEX 30 MIN: CPT

## 2023-08-30 PROCEDURE — 97110 THERAPEUTIC EXERCISES: CPT

## 2023-08-30 NOTE — PLAN OF CARE
[facility]  Physical Therapy Initial Evaluation    Name: Zaira Garcia  Clinic Number: [id number]    Therapy Diagnosis:   Encounter Diagnoses   Name Primary?    Ataxia     Low back pain     Chronic inflammatory demyelinating polyradiculoneuropathy        Physician: Portia Bateman MD    Physician Orders: PT Eval and Treat Weakness  Medical Diagnosis from Referral: R29.898  Evaluation Date: 8/30/2023  Authorization Period Expiration: 08/30/2024  Plan of Care Expiration: 11/30/2023  Visit # / Visits authorized: 1/ 16    Time In: 1315  Time Out: 1400  Total Appointment Time (timed & untimed codes): 30 minutes    Precautions: Standard and Fall    Subjective     Date of onset: 08/01/23    History of current condition - Rolando reports: fell at home 2 weeks ago with pain in knees and hips  the patient is anxious to regain function,confidence and prior mobility     Falls: High Risk    Prior Therapy: Home Health  Social History: lives with their spouse  Steps/Stairs: ramp at home  Occupation: retired  Driving: No  Durable Medical Equipment: walker, wheel chair,scooter  Dominant Extremity: right  Affected Extremity: both  Prior Level of Function: assisted with limitations in IADL   Current Level of Function: decline since falling ,moderate to severe  limitations     Pain:  Current 3/10, worst 5/10, best 6/10   Location: right knee   Description: Aching, Dull, and Tight  Aggravating Factors: Standing and Getting out of bed/chair  Easing Factors: relaxation and rest    Pts goals: regain walking with walker throughout residence    Medical History:   Past Medical History:   Diagnosis Date    GERD (gastroesophageal reflux disease)     Hypercholesterolemia        Surgical History:   Zaira Garcia  has a past surgical history that includes Carpal tunnel release (Left).    Medications:   Zaira has a current medication list which includes the following prescription(s):  baclofen, ergocalciferol, gabapentin, omeprazole, and rosuvastatin.    Allergies:   Review of patient's allergies indicates:  No Known Allergies       Objective     Range of motion:   Right Left    Hip flexion 115 117   Hip abduction WFL WFL   Hip adduction WFL WFL   Knee extension 5 6   Knee flexion 115 125   Ankle DF 4 5   Ankle PF 25 28     Manual muscle test:   Right  Left    Hip flexion  MMT strength: 3+/5  MMT strength: 3+/5   Hip abduction  MMT strength: 4-/5  MMT strength: 3+/5   Hip adduction  MMT strength: 3+/5  MMT strength: 4-/5   Knee extension  MMT strength: 3+/5  MMT strength: 3+/5   Knee flexion  MMT strength: 3+/5  MMT strength: 3+/5   Ankle dorsiflexion  MMT strength: 2/5  MMT strength: 3-/5   Ankle plantarflexion  MMT strength: 2/5  MMT strength: 2/5     Clinical Special Tests: ATAXIA      Gait: moderate to severe gait deviations with past pointing ,excessive R hip external rotation with toe out in stance phase of gait. Genu recurvatum in stance phase at mid stance  Weight bearing precautions: WBAT  Assistive device: rolling walker  Ambulation distance and deviations: 15 ft  Stairs: unable     Limitation/Restriction for FOTO Intake Survey    Therapist reviewed FOTO scores for Zaira Garcia on 8/30/2023.   FOTO documents entered into EPIC - see Media section.    Limitation Score: >50 % with LEFS Await FOTO Survey Report       Treatment     Total Treatment time (time-based codes) separate from Evaluation: 30 minutes     Rolando Garcia received the treatments listed below:      Therapeutic exercises to develop strength, endurance, ROM, and posture for 30 minutes including:  SCI FIT RECUMBENT SEATED STEPPER 5 MINUTES LEVEL 5 yes  CALF STRETCH/SLANT BOARD 30 SEC X 4  CALF RAISES 2 SETS X 10 REPS yes  PRONE KNEE FLEXION 2 SETS X 10 REPS  PRONE QUAD SETS 2 SETS X 10 REPS  RESISTIVE HEEL SLIDES  SEATED 2 SETS X 10 REPS yes  90/90 KNEE FLEXION 2 SETS X 10 REPS  STRAIGHT LEG RAISE 2 SETS X 10  "REPS  INSIDE LEG RAISE standind 4 plane 2 sets x 10 reps yes  SUPINE HEEL SLIDE  SHORT ARC QUAD yes  MINI SQUAT 2 SETS X 10 REPS yes  LONG ARC QUAD with BALL SQUEEZE  2 SETS X 10 REPS  Pilates Ring x 50 rep yes  Hip abduction seated x red band 30 reps yes         Neuromuscular re-education activities to improve: Balance, Coordination, Proprioception, and Posture for 5 minutes. The following activities were included:      Therapeutic activities to improve functional performance for 0  minutes, including:      Gait training to improve functional mobility and safety for 0  minutes, including:    Patient Education and Home Exercises     Education provided: Initiate Teaching with Family    Written Home Exercises Provided: Patient instructed to cont prior HEP. Exercises were reviewed and Rolando was able to demonstrate them prior to the end of the session. Rolando demonstrated fair  understanding of the education provided.     See EMR under "Patient Instructions" for exercises provided during therapy sessions.    Assessment     Zaira is a 82 y.o. male referred to outpatient physical therapy with a medical diagnosis of Leg weakness. Pt presents to PT re strengthening rehab to improve activity tolerances.  This has good potential to reach stated goals and good potential to regain a prior level of Function. Patient will require  an essential,customized,progressive,skilled ,PT service to address identified impairments leading to current dysfunction .All deficits including strength,mobility,balance, ROM, pain and safety can be challenged with  this rehab program We expect patient to improve with exercise tolerance as well as overall activity tolerances as appropriate for activities of daily living, work,leisure, recreation. Also expect  patient to have decreased pain and /or limitation during and after specific activity. Patient Education , Home Program component will also be included in overall rehab plan.   Pt prognosis is Good. "   Pt will benefit from skilled outpatient Physical Therapy to address the deficits stated above and in the chart below, provide pt/family education, and to maximize pt's level of independence.     Plan of care discussed with patient: Yes  Pt's spiritual, cultural and educational needs considered and pt agreeable to plan of care and goals as stated below:     Anticipated Barriers for therapy: Transportation    Medical Necessity is demonstrated by the following:  History  Co-morbidities and personal factors that may impact the plan of care [] LOW: no personal factors / co-morbidities  [x] MODERATE: 1-2 personal factors / co-morbidities  [] HIGH: 3+ personal factors / co-morbidities    Moderate / High Support Documentation:   Co-morbidities affecting plan of care: Back Pain, Ataxia,Leg weakness    Personal Factors:   coping style     Examination  Body Structures and Functions, activity limitations and participation restrictions that may impact the plan of care [x] LOW: addressing 1-2 elements  [x] MODERATE: 3+ elements  [] HIGH: 4+ elements (please support below)    Moderate / High Support Documentation:      Clinical Presentation [] LOW: stable  [x] MODERATE: Evolving  [] HIGH: Unstable     Decision Making/ Complexity Score: moderate       Goals:    Short Term Goals:  Patient will demonstrate independence with home exercise program to ensure carryover of treatment.  Patient will perform supine to/from sit with modified independence to improve independence and safety with bed mobility.  Patient will perform sit to/from stand with modified independence to improve independence and safety with transfers.  Patient will improve bilateral lower extremity strength to 4-/5 to improve independence and safety with bed mobility, transfers, and gait.  Patient will improve Tinetti Balance + Gait score to 18/28 to improve dynamic standing balance and lower fall risk.   Patient will ambulate 50 feet with a rolling walker with  modified independence to improve independence and safety with gait.     Long Term Goals:  Patient will perform supine to/from sit with independence to improve independence and safety with bed mobility.  Patient will perform sit to/from stand with independence to improve independence and safety with transfers.  Patient will improve bilateral lower extremity strength to 4-/5 to improve independence and safety with bed mobility, transfers, and gait.  Patient will improve Tinetti Balance + Gait score to 20/28 to improve dynamic standing balance and lower fall risk.   Patient will ambulate 125 feet with a rolling walker with modified independence including up/down curbs and ramps to improve independence and safety with community ambulation.    Plan     Plan of care Certification: 8/30/2023 to 11/30/2023    Outpatient Physical Therapy 2 times weekly for 8 weeks to include the following interventions: Neuromuscular Re-ed, Therapeutic Activities, and Therapeutic Exercise.       Carlos Aparicio PT, DPT,OCS  8/30/2023      Physician's Signature: _________________________________________  Date: __________________________

## 2023-09-06 ENCOUNTER — CLINICAL SUPPORT (OUTPATIENT)
Dept: REHABILITATION | Facility: HOSPITAL | Age: 83
End: 2023-09-06
Payer: MEDICARE

## 2023-09-06 DIAGNOSIS — R27.0 ATAXIA: Primary | ICD-10-CM

## 2023-09-06 PROCEDURE — 97110 THERAPEUTIC EXERCISES: CPT

## 2023-09-06 PROCEDURE — 97112 NEUROMUSCULAR REEDUCATION: CPT

## 2023-09-06 NOTE — PROGRESS NOTES
[facility]  Physical Therapy Progress Note    Name: Dorian Salinas  Clinic Number: [id number]    Therapy Diagnosis:   Encounter Diagnosis   Name Primary?    Weakness acquired in ICU Yes       Physician: Jasmyn Khan ACNP    Physician Orders: PT Eval and Treat   Medical Diagnosis from Referral: R 53.1  Evaluation Date: 9/6/2023  Authorization Period Expiration: Evaluation and await approval  Plan of Care Expiration: 11/01/2023  Visit # / Visits authorized: 2/ 16    Time In: 1015  Time Out: 1100  Total Appointment Time (timed & untimed codes): 30 minutes    Precautions: Standard and Fall    Subjective     Date of onset: 08/14/2023    History of current condition - Dorian reports: decline in function after surgery for cancer two weeks ago ,was in swing bed program. Patient motivated to continue to advance   Today patient in good spirits and energetic, motivated to improve  Falls: none reported     Imaging: CT scan films:     Prior Therapy: swing bed  Social History: lives with their spouse  Steps/Stairs: 2 steps in home  Occupation: retired   Driving: No  Durable Medical Equipment: cane  Dominant Extremity: right  Affected Extremity: both  Prior Level of Function: mild pain and or limitation with light work activity  Current Level of Function: moderate pain and or limitation with daily activity    Pain:  Current 4/10, worst 6/10, best 2/10   Location: bilateral back  and generalized   Description: Burning, Numb, and Hot  Aggravating Factors: Standing and Lifting  Easing Factors: pain medication and rest    Pts goals: regain prior level of safe function and activity with more energy    Medical History:   Past Medical History:   Diagnosis Date    Hypertension     Malignant neoplasm of sigmoid colon     Neuropathy 11/09/2022    S/P left hemicolectomy 02/08/2023    Dr. Titus Lobato       Surgical History:   Dorian Salinas  has a past surgical history that includes  Robot-assisted laparoscopic resection of sigmoid colon using da Brian Xi (N/A, 2/8/2023); Laparotomy (N/A, 2/8/2023); and laparotomy, exploratory (N/A, 7/11/2023).    Medications:   Dorian has a current medication list which includes the following prescription(s): dextrose 5 % in water (D5W) 5 % PgBk 50 mL with ceFAZolin 2 gram SolR 2 g, gabapentin, lisinopril, and metoprolol tartrate.    Allergies:   Review of patient's allergies indicates:   Allergen Reactions    Pcn [penicillins]           Objective     Range of motion:   Right Left    Hip flexion (120) WFL WFL   Hip extension WFL WFL   Hip abduction 15 21   Hip adduction WFL WFL   Internal rotation (45) 22 12   External rotation (45) 15 30   Hamstring 90/90 (-10) moderate restricted Restricted    Knee extension 5 +4   Knee flexion (120) WFL WFL     Manual muscle testing:   Right  Left    Hip flexion  MMT strength: 3+/5  MMT strength: 3+/5   Hip extension  MMT strength: 3+/5  MMT strength: 3+/5   Hip abduction  MMT strength: 3+/5  MMT strength: 3+/5   Hip adduction  MMT strength: 3/5  MMT strength: 3-/5   Hip internal rotation  MMT strength: 3+/5  MMT strength: 3+/5   Hip external rotation  MMT strength: 4-/5  MMT strength: 3+/5   Knee extension  MMT strength: 3+/5  MMT strength: 3+/5   Knee flexion  MMT strength: 4-/5  MMT strength: 3+/5     Limitation/Restriction for FOTO Intake Survey    Therapist reviewed FOTO scores for Dorian Salinas on 9/6/2023.   FOTO documents entered into [employer] - see Media section.    Limitation Score: > 40 % LEFS await FOTO Report       Treatment     Total Treatment time (time-based codes) separate from Evaluation: 30 minutes     Dorian Salinas received the treatments listed below:      Therapeutic exercises to develop strength, endurance, posture, and core stabilization for 0 minutes including:    SCI FIT RECUMBENT SEATED STEPPER 5 MINUTES LEVEL 5 x 6 min    CALF RAISES 2 SETS X 10 REPS  PRONE QUAD SETS 2 SETS X 10 REPS  RESISTIVE HEEL  "SLIDES  SEATED 2 SETS X 10 REPS  90/90 KNEE FLEXION 2 SETS X 10 REPS  STRAIGHT LEG RAISE 2 SETS X 10 REPS  INSIDE LEG RAISE  SUPINE HEEL SLIDE  SHORT ARC QUAD  MINI SQUAT 2 SETS X 10 REPS  LONG ARC QUAD with BALL SQUEEZE  2 SETS X 10 REPS       Neuromuscular re-education activities to improve: Balance, Coordination, and Posture for 15 minutes. The following activities were included:  Ambulation and balance  training in parallel bars  Standing hip flexion 2 sets x 10 reps  Standing hip abduction 2 sets x 10 reps   Weight shifts      Gait training to improve functional mobility and safety for 0  minutes    Patient Education and Home Exercises     Education provided: will include Home Program    Written Home Exercises Provided: Patient instructed to cont prior HEP. Exercises were reviewed and Dorian was able to demonstrate them prior to the end of the session. Dorian demonstrated good  understanding of the education provided.     See EMR under "Patient Instructions" for exercises provided during therapy sessions.    Assessment     Dorian is a 64 y.o. male referred to outpatient physical therapy with a medical diagnosis of Weakness . Pt presents to PT for rehab to regain prior performance level.  This has good potential to reach stated goals and good potential to regain a prior level of Function. Patient will require  an essential,customized,progressive,skilled ,PT service to address identified impairments leading to current dysfunction .All deficits including strength,mobility,balance, ROM, pain and safety can be challenged with  this rehab program We expect patient to improve with exercise tolerance as well as overall activity tolerances as appropriate for activities of daily living, work,leisure, recreation. Also expect  patient to have decreased pain and /or limitation during and after specific activity. Patient Education , Home Program component will also be included in overall rehab plan.   Pt prognosis is Good.   Pt " will benefit from skilled outpatient Physical Therapy to address the deficits stated above and in the chart below, provide pt/family education, and to maximize pt's level of independence.   This patient will continue to benefit from skilled outpatient physical therapy to address the identified deficits as listed in the problem list box on initial evaluation, provide pt/family education,and to maximize pt's level of independence in the home and community environment.   Plan of care discussed with patient: Yes  Pt's spiritual, cultural and educational needs considered and pt agreeable to plan of care and goals as stated below:         Goals:    Short Term Goals:  Patient will demonstrate independence with home exercise program to ensure carryover of treatment.  Patient will perform supine to/from sit with independence to improve independence and safety with bed mobility.  Patient will perform sit to/from stand with independence to improve independence and safety with transfers.  Patient will improve bilateral lower extremity strength to 4-/5 to improve independence and safety with bed mobility, transfers, and gait.  Patient will improve Tinetti Balance + Gait score to 20/24 to improve dynamic standing balance and lower fall risk.   Patient will ambulate 300 feet with a single point cane with independence to improve independence and safety with gait.     Long Term Goals:  Patient will perform supine to/from sit with independence to improve independence and safety with bed mobility.  Patient will perform sit to/from stand with independence to improve independence and safety with transfers.  Patient will improve bilateral lower extremity strength to 4+/5 to improve independence and safety with bed mobility, transfers, and gait.  Patient will improve Tinetti Balance + Gait score to 22/228 to improve dynamic standing balance and lower fall risk.   Patient will ambulate 500 feet with a Independent with independence  including up/down curbs and ramps to improve independence and safety with community ambulation.    Plan     Plan of care Certification: 9/6/2023 to 11/31/2023.    Outpatient Physical Therapy 2 times weekly for 8 weeks to include the following interventions: Neuromuscular Re-ed, Therapeutic Activities, and Therapeutic Exercise.   Clinical Information for [facility] Authorization     Dates of Services: 08/31/2023 to 11/30/2023  Discharge Plan: Patient will be discharged from skilled physical therapy treatment once all goals have been met, patient has plateaued, or physician/insurance requests discontinuation of care. Patient will be discharged with a home exercise program.   Type of therapy: Rehabilitative  ICD-10 Diagnosis Code(s): R 53.1  Which side is symptomatic? Left  Surgical: Yes  Surgical procedure: Colon Cancer  Surgery date: 08/14/2023.  Presenting symptoms/diagnoses are unspecified in nature.  Presenting symptoms are radiating in nature.   The rehabilitation is related to a diagnosis of cancer.  The rehabilitation is not related to a diagnosis of lymphedema.  Patient's clinical presentation is:  Moderate objective and functional deficits: consistent symptoms and/or symptoms that are intensified with activity with moderate loss of range of motion, strength, or ability to perform daily tasks  CPT Codes Requested:  78454 [therapeutic exercise], 32838 [neuromuscular re-education], and 64004 [therapeutic activities]     Carlos Aparicio PT, DPT,DPT  9/6/2023      Physician's Signature: _________________________________________  Date: __________________________

## 2023-09-06 NOTE — PLAN OF CARE
[facility]  Physical Therapy Initial Evaluation    Name: Zaira Garcia  Clinic Number: [id number]    Therapy Diagnosis:   No diagnosis found.      Physician: Portia Bateman MD    Physician Orders: PT Eval and Treat Weakness  Medical Diagnosis from Referral: R29.898  Evaluation Date: 9/6/2023  Authorization Period Expiration: 08/30/2024  Plan of Care Expiration: 11/30/2023  Visit # / Visits authorized: 1/ 16    Time In: 1315  Time Out: 1400  Total Appointment Time (timed & untimed codes): 30 minutes    Precautions: Standard and Fall    Subjective     Date of onset: 08/01/23    History of current condition - Rolando reports: fell at home 2 weeks ago with pain in knees and hips  the patient is anxious to regain function,confidence and prior mobility     Falls: High Risk    Prior Therapy: Home Health  Social History: lives with their spouse  Steps/Stairs: ramp at home  Occupation: retired  Driving: No  Durable Medical Equipment: walker, wheel chair,scooter  Dominant Extremity: right  Affected Extremity: both  Prior Level of Function: assisted with limitations in IADL   Current Level of Function: decline since falling ,moderate to severe  limitations     Pain:  Current 3/10, worst 5/10, best 6/10   Location: right knee   Description: Aching, Dull, and Tight  Aggravating Factors: Standing and Getting out of bed/chair  Easing Factors: relaxation and rest    Pts goals: regain walking with walker throughout residence    Medical History:   Past Medical History:   Diagnosis Date    GERD (gastroesophageal reflux disease)     Hypercholesterolemia        Surgical History:   Zaira Garcia  has a past surgical history that includes Carpal tunnel release (Left).    Medications:   Zaira has a current medication list which includes the following prescription(s): baclofen, ergocalciferol, gabapentin, omeprazole, and rosuvastatin.    Allergies:   Review of patient's allergies  indicates:  No Known Allergies       Objective     Range of motion:   Right Left    Hip flexion 115 117   Hip abduction WFL WFL   Hip adduction WFL WFL   Knee extension 5 6   Knee flexion 115 125   Ankle DF 4 5   Ankle PF 25 28     Manual muscle test:   Right  Left    Hip flexion  MMT strength: 3+/5  MMT strength: 3+/5   Hip abduction  MMT strength: 4-/5  MMT strength: 3+/5   Hip adduction  MMT strength: 3+/5  MMT strength: 4-/5   Knee extension  MMT strength: 3+/5  MMT strength: 3+/5   Knee flexion  MMT strength: 3+/5  MMT strength: 3+/5   Ankle dorsiflexion  MMT strength: 2/5  MMT strength: 3-/5   Ankle plantarflexion  MMT strength: 2/5  MMT strength: 2/5     Clinical Special Tests: ATAXIA      Gait: moderate to severe gait deviations with past pointing ,excessive R hip external rotation with toe out in stance phase of gait. Genu recurvatum in stance phase at mid stance  Weight bearing precautions: WBAT  Assistive device: rolling walker  Ambulation distance and deviations: 15 ft  Stairs: unable     Limitation/Restriction for FOTO Intake Survey    Therapist reviewed FOTO scores for Zaira Garcia on 9/6/2023.   FOTO documents entered into [employer] - see Media section.    Limitation Score: >50 % with LEFS Await FOTO Survey Report       Treatment     Total Treatment time (time-based codes) separate from Evaluation: 30 minutes     Rolando Garcia received the treatments listed below:      Therapeutic exercises to develop strength, endurance, ROM, and posture for 30 minutes including:  SCI FIT RECUMBENT SEATED STEPPER 5 MINUTES LEVEL 5 yes  CALF STRETCH/SLANT BOARD 30 SEC X 4  CALF RAISES 2 SETS X 10 REPS yes  PRONE KNEE FLEXION 2 SETS X 10 REPS  PRONE QUAD SETS 2 SETS X 10 REPS  RESISTIVE HEEL SLIDES  SEATED 2 SETS X 10 REPS yes  90/90 KNEE FLEXION 2 SETS X 10 REPS  STRAIGHT LEG RAISE 2 SETS X 10 REPS  INSIDE LEG RAISE standind 4 plane 2 sets x 10 reps yes  SUPINE HEEL SLIDE  SHORT ARC QUAD yes  MINI SQUAT 2  "SETS X 10 REPS yes  LONG ARC QUAD with BALL SQUEEZE  2 SETS X 10 REPS  Pilates Ring x 50 rep yes  Hip abduction seated x red band 30 reps yes         Neuromuscular re-education activities to improve: Balance, Coordination, Proprioception, and Posture for 5 minutes. The following activities were included:      Therapeutic activities to improve functional performance for 0  minutes, including:      Gait training to improve functional mobility and safety for 0  minutes, including:    Patient Education and Home Exercises     Education provided: Initiate Teaching with Family    Written Home Exercises Provided: Patient instructed to cont prior HEP. Exercises were reviewed and Rolando was able to demonstrate them prior to the end of the session. Rolando demonstrated fair  understanding of the education provided.     See EMR under "Patient Instructions" for exercises provided during therapy sessions.    Assessment     Zaira is a 82 y.o. male referred to outpatient physical therapy with a medical diagnosis of Leg weakness. Pt presents to PT re strengthening rehab to improve activity tolerances.  This has good potential to reach stated goals and good potential to regain a prior level of Function. Patient will require  an essential,customized,progressive,skilled ,PT service to address identified impairments leading to current dysfunction .All deficits including strength,mobility,balance, ROM, pain and safety can be challenged with  this rehab program We expect patient to improve with exercise tolerance as well as overall activity tolerances as appropriate for activities of daily living, work,leisure, recreation. Also expect  patient to have decreased pain and /or limitation during and after specific activity. Patient Education , Home Program component will also be included in overall rehab plan.   Pt prognosis is Good.   Pt will benefit from skilled outpatient Physical Therapy to address the deficits stated above and in the chart " below, provide pt/family education, and to maximize pt's level of independence.     Plan of care discussed with patient: Yes  Pt's spiritual, cultural and educational needs considered and pt agreeable to plan of care and goals as stated below:     Anticipated Barriers for therapy: Transportation    Medical Necessity is demonstrated by the following:  History  Co-morbidities and personal factors that may impact the plan of care [] LOW: no personal factors / co-morbidities  [x] MODERATE: 1-2 personal factors / co-morbidities  [] HIGH: 3+ personal factors / co-morbidities    Moderate / High Support Documentation:   Co-morbidities affecting plan of care: Back Pain, Ataxia,Leg weakness    Personal Factors:   coping style     Examination  Body Structures and Functions, activity limitations and participation restrictions that may impact the plan of care [x] LOW: addressing 1-2 elements  [x] MODERATE: 3+ elements  [] HIGH: 4+ elements (please support below)    Moderate / High Support Documentation:      Clinical Presentation [] LOW: stable  [x] MODERATE: Evolving  [] HIGH: Unstable     Decision Making/ Complexity Score: moderate       Goals:    Short Term Goals:  Patient will demonstrate independence with home exercise program to ensure carryover of treatment.  Patient will perform supine to/from sit with modified independence to improve independence and safety with bed mobility.  Patient will perform sit to/from stand with modified independence to improve independence and safety with transfers.  Patient will improve bilateral lower extremity strength to 4-/5 to improve independence and safety with bed mobility, transfers, and gait.  Patient will improve Tinetti Balance + Gait score to 18/28 to improve dynamic standing balance and lower fall risk.   Patient will ambulate 50 feet with a rolling walker with modified independence to improve independence and safety with gait.     Long Term Goals:  Patient will perform supine  to/from sit with independence to improve independence and safety with bed mobility.  Patient will perform sit to/from stand with independence to improve independence and safety with transfers.  Patient will improve bilateral lower extremity strength to 4-/5 to improve independence and safety with bed mobility, transfers, and gait.  Patient will improve Tinetti Balance + Gait score to 20/28 to improve dynamic standing balance and lower fall risk.   Patient will ambulate 125 feet with a rolling walker with modified independence including up/down curbs and ramps to improve independence and safety with community ambulation.    Plan     Plan of care Certification: 9/6/2023 to 11/30/2023    Outpatient Physical Therapy 2 times weekly for 8 weeks to include the following interventions: Neuromuscular Re-ed, Therapeutic Activities, and Therapeutic Exercise.       Carlos Aparicio PT, DPT,OCS  9/6/2023      Physician's Signature: _________________________________________  Date: __________________________

## 2023-09-06 NOTE — PROGRESS NOTES
[facility]  Physical Therapy Progress Note    Name: Zaira Garcia  Clinic Number: [id number]    Therapy Diagnosis:   No diagnosis found.      Physician: Portia Bateman MD    Physician Orders: PT Eval and Treat Weakness  Medical Diagnosis from Referral: R29.898  Evaluation Date: 9/6/2023  Authorization Period Expiration: 08/30/2024  Plan of Care Expiration: 11/30/2023  Visit # / Visits authorized: 2/ 16    Time In: 1315  Time Out: 1400  Total Appointment Time (timed & untimed codes): 30 minutes    Precautions: Standard and Fall    Subjective     Date of onset: 08/01/23  Pleasant, energetic, with good efforts with all activity today  History of current condition - Rolando reports: fell at home 2 weeks ago with pain in knees and hips  the patient is anxious to regain function,confidence and prior mobility   Pts goals: regain walking with walker throughout residence    Medical History:   Past Medical History:   Diagnosis Date    GERD (gastroesophageal reflux disease)     Hypercholesterolemia        Objective     Range of motion:   Right Left    Hip flexion 115 117   Hip abduction WFL WFL   Hip adduction WFL WFL   Knee extension 5 6   Knee flexion 115 125   Ankle DF 4 5   Ankle PF 25 28     Manual muscle test:   Right  Left    Hip flexion  MMT strength: 3+/5  MMT strength: 3+/5   Hip abduction  MMT strength: 4-/5  MMT strength: 3+/5   Hip adduction  MMT strength: 3+/5  MMT strength: 4-/5   Knee extension  MMT strength: 3+/5  MMT strength: 3+/5   Knee flexion  MMT strength: 3+/5  MMT strength: 3+/5   Ankle dorsiflexion  MMT strength: 2/5  MMT strength: 3-/5   Ankle plantarflexion  MMT strength: 2/5  MMT strength: 2/5       Treatment     Total Treatment time (time-based codes) separate from Evaluation: 30 minutes     Rolando Garcia received the treatments listed below:      Therapeutic exercises to develop strength, endurance, ROM, and posture for 45 minutes  "including:  SCI FIT RECUMBENT SEATED STEPPER 5 MINUTES LEVEL 5 yes  CALF STRETCH/SLANT BOARD 30 SEC X 4  CALF RAISES 2 SETS X 10 REPS yes  PRONE KNEE FLEXION 2 SETS X 10 REPS  PRONE QUAD SETS 2 SETS X 10 REPS  RESISTIVE HEEL SLIDES  SEATED 2 SETS X 10 REPS yes  90/90 KNEE FLEXION 2 SETS X 10 REPS  STRAIGHT LEG RAISE 2 SETS X 10 REPS  INSIDE LEG RAISE standind 4 plane 2 sets x 10 reps yes  SUPINE HEEL SLIDE  SHORT ARC QUAD yes  MINI SQUAT 2 SETS X 10 REPS yes  LONG ARC QUAD with BALL SQUEEZE  2 SETS X 10 REPS  Pilates Ring x 50 rep yes  Hip abduction seated x red band 30 reps yes         Neuromuscular re-education activities to improve: Balance, Coordination, Proprioception, and Posture for 5 minutes. The following activities were included:      Therapeutic activities to improve functional performance for 0  minutes, including:      Gait training to improve functional mobility and safety for 0  minutes, including:    Patient Education and Home Exercises     Education provided: Initiate Teaching with Family    Written Home Exercises Provided: Patient instructed to cont prior HEP. Exercises were reviewed and Rolando was able to demonstrate them prior to the end of the session. Rolando demonstrated fair  understanding of the education provided.     See EMR under "Patient Instructions" for exercises provided during therapy sessions.    Assessment     Zaira is a 82 y.o. male referred to outpatient physical therapy with a medical diagnosis of Leg weakness. Pt presents to PT re strengthening rehab to improve activity tolerances.  This has good potential to reach stated goals and good potential to regain a prior level of Function. Patient will require  an essential,customized,progressive,skilled ,PT service to address identified impairments leading to current dysfunction .All deficits including strength,mobility,balance, ROM, pain and safety can be challenged with  this rehab program We expect patient to improve with exercise " tolerance as well as overall activity tolerances as appropriate for activities of daily living, work,leisure, recreation. Also expect  patient to have decreased pain and /or limitation during and after specific activity. Patient Education , Home Program component will also be included in overall rehab plan.   Pt prognosis is Good.   Pt will benefit from skilled outpatient Physical Therapy to address the deficits stated above and in the chart below, provide pt/family education, and to maximize pt's level of independence.     Plan of care discussed with patient: Yes  Pt's spiritual, cultural and educational needs considered and pt agreeable to plan of care and goals as stated below:       Goals:    Short Term Goals:  Patient will demonstrate independence with home exercise program to ensure carryover of treatment.  Patient will perform supine to/from sit with modified independence to improve independence and safety with bed mobility.  Patient will perform sit to/from stand with modified independence to improve independence and safety with transfers.  Patient will improve bilateral lower extremity strength to 4-/5 to improve independence and safety with bed mobility, transfers, and gait.  Patient will improve Tinetti Balance + Gait score to 18/28 to improve dynamic standing balance and lower fall risk.   Patient will ambulate 50 feet with a rolling walker with modified independence to improve independence and safety with gait.     Long Term Goals:  Patient will perform supine to/from sit with independence to improve independence and safety with bed mobility.  Patient will perform sit to/from stand with independence to improve independence and safety with transfers.  Patient will improve bilateral lower extremity strength to 4-/5 to improve independence and safety with bed mobility, transfers, and gait.  Patient will improve Tinetti Balance + Gait score to 20/28 to improve dynamic standing balance and lower fall risk.    Patient will ambulate 125 feet with a rolling walker with modified independence including up/down curbs and ramps to improve independence and safety with community ambulation.    Plan     Plan of care Certification: 9/6/2023 to 11/30/2023    Outpatient Physical Therapy 2 times weekly for 8 weeks to include the following interventions: Neuromuscular Re-ed, Therapeutic Activities, and Therapeutic Exercise.       Carlos Aparicio PT, DPT,OCS  9/6/2023    _

## 2023-09-11 ENCOUNTER — CLINICAL SUPPORT (OUTPATIENT)
Dept: REHABILITATION | Facility: HOSPITAL | Age: 83
End: 2023-09-11
Payer: MEDICARE

## 2023-09-11 DIAGNOSIS — R27.0 ATAXIA: Primary | ICD-10-CM

## 2023-09-11 PROCEDURE — 97110 THERAPEUTIC EXERCISES: CPT

## 2023-09-11 NOTE — PROGRESS NOTES
[facility]  Physical Therapy Progress Note    Name: Zaira Garcia  Clinic Number: [id number]    Therapy Diagnosis:   No diagnosis found.      Physician: Portia Bateman MD    Physician Orders: PT Eval and Treat Weakness  Medical Diagnosis from Referral: R29.898  Evaluation Date: 9/6/2023  Authorization Period Expiration: 08/30/2024  Plan of Care Expiration: 11/30/2023  Visit # / Visits authorized: 2/ 16    Time In: 1315  Time Out: 1400  Total Appointment Time (timed & untimed codes): 45 minutes    Precautions: Standard and Fall    Subjective     Date of onset: 08/01/23  Pleasant, energetic, with good efforts with all activity today  History of current condition - Rolando reports: fell at home 2 weeks ago with pain in knees and hips  the patient is anxious to regain function,confidence and prior mobility   Pts goals: regain walking with walker throughout residence    Medical History:   Past Medical History:   Diagnosis Date    GERD (gastroesophageal reflux disease)     Hypercholesterolemia        Objective     Range of motion:   Right Left    Hip flexion 115 117   Hip abduction WFL WFL   Hip adduction WFL WFL   Knee extension 5 6   Knee flexion 115 125   Ankle DF 4 5   Ankle PF 25 28     Manual muscle test:   Right  Left    Hip flexion  MMT strength: 3+/5  MMT strength: 3+/5   Hip abduction  MMT strength: 4-/5  MMT strength: 3+/5   Hip adduction  MMT strength: 3+/5  MMT strength: 4-/5   Knee extension  MMT strength: 3+/5  MMT strength: 3+/5   Knee flexion  MMT strength: 3+/5  MMT strength: 3+/5   Ankle dorsiflexion  MMT strength: 2/5  MMT strength: 3-/5   Ankle plantarflexion  MMT strength: 2/5  MMT strength: 2/5       Treatment     Total Treatment time (time-based codes) separate from Evaluation: 30 minutes     Rolando Garcia received the treatments listed below:      Therapeutic exercises to develop strength, endurance, ROM, and posture for 45 minutes  "including:  SCI FIT RECUMBENT SEATED STEPPER 5 MINUTES LEVEL 5 yes  CALF STRETCH/SLANT BOARD 30 SEC X 4  CALF RAISES 2 SETS X 10 REPS yes  PRONE KNEE FLEXION 2 SETS X 10 REPS  PRONE QUAD SETS 2 SETS X 10 REPS  RESISTIVE HEEL SLIDES  SEATED 2 SETS X 10 REPS yes  90/90 KNEE FLEXION 2 SETS X 10 REPS  STRAIGHT LEG RAISE 2 SETS X 10 REPS  INSIDE LEG RAISE standind 4 plane 2 sets x 10 reps yes  SUPINE HEEL SLIDE  SHORT ARC QUAD yes  MINI SQUAT 2 SETS X 10 REPS yes  LONG ARC QUAD with BALL SQUEEZE  2 SETS X 10 REPS  Pilates Ring x 50 rep yes  Hip abduction seated x red band 30 reps yes         Neuromuscular re-education activities to improve: Balance, Coordination, Proprioception, and Posture for 5 minutes. The following activities were included:      Therapeutic activities to improve functional performance for 0  minutes, including:      Gait training to improve functional mobility and safety for 0  minutes, including:    Patient Education and Home Exercises     Education provided: Initiate Teaching with Family    Written Home Exercises Provided: Patient instructed to cont prior HEP. Exercises were reviewed and Rolando was able to demonstrate them prior to the end of the session. Rolando demonstrated fair  understanding of the education provided.     See EMR under "Patient Instructions" for exercises provided during therapy sessions.    Assessment     Zaira is a 82 y.o. male referred to outpatient physical therapy with a medical diagnosis of Leg weakness. Pt presents to PT re strengthening rehab to improve activity tolerances.  This has good potential to reach stated goals and good potential to regain a prior level of Function. Patient will require  an essential,customized,progressive,skilled ,PT service to address identified impairments leading to current dysfunction .All deficits including strength,mobility,balance, ROM, pain and safety can be challenged with  this rehab program We expect patient to improve with exercise " tolerance as well as overall activity tolerances as appropriate for activities of daily living, work,leisure, recreation. Also expect  patient to have decreased pain and /or limitation during and after specific activity. Patient Education , Home Program component will also be included in overall rehab plan.   Pt prognosis is Good.   Pt will benefit from skilled outpatient Physical Therapy to address the deficits stated above and in the chart below, provide pt/family education, and to maximize pt's level of independence.     Plan of care discussed with patient: Yes  Pt's spiritual, cultural and educational needs considered and pt agreeable to plan of care and goals as stated below:       Goals:    Short Term Goals:  Patient will demonstrate independence with home exercise program to ensure carryover of treatment.  Patient will perform supine to/from sit with modified independence to improve independence and safety with bed mobility.  Patient will perform sit to/from stand with modified independence to improve independence and safety with transfers.  Patient will improve bilateral lower extremity strength to 4-/5 to improve independence and safety with bed mobility, transfers, and gait.  Patient will improve Tinetti Balance + Gait score to 18/28 to improve dynamic standing balance and lower fall risk.   Patient will ambulate 50 feet with a rolling walker with modified independence to improve independence and safety with gait.     Long Term Goals:  Patient will perform supine to/from sit with independence to improve independence and safety with bed mobility.  Patient will perform sit to/from stand with independence to improve independence and safety with transfers.  Patient will improve bilateral lower extremity strength to 4-/5 to improve independence and safety with bed mobility, transfers, and gait.  Patient will improve Tinetti Balance + Gait score to 20/28 to improve dynamic standing balance and lower fall risk.    Patient will ambulate 125 feet with a rolling walker with modified independence including up/down curbs and ramps to improve independence and safety with community ambulation.    Plan     Plan of care Certification: 9/6/2023 to 11/30/2023    Outpatient Physical Therapy 2 times weekly for 8 weeks to include the following interventions: Neuromuscular Re-ed, Therapeutic Activities, and Therapeutic Exercise.       Carlos Aparicio PT, DPT,OCS  9/11/23    _

## 2023-09-13 ENCOUNTER — CLINICAL SUPPORT (OUTPATIENT)
Dept: REHABILITATION | Facility: HOSPITAL | Age: 83
End: 2023-09-13
Payer: MEDICARE

## 2023-09-13 DIAGNOSIS — R27.0 ATAXIA: Primary | ICD-10-CM

## 2023-09-13 PROCEDURE — 97110 THERAPEUTIC EXERCISES: CPT

## 2023-09-13 NOTE — PROGRESS NOTES
[facility]  Physical Therapy Progress Note    Name: Zaira Garcia  Clinic Number: [id number]    Therapy Diagnosis:   No diagnosis found.      Physician: Portia Bateman MD    Physician Orders: PT Eval and Treat Weakness  Medical Diagnosis from Referral: R29.898  Evaluation Date: 9/6/2023  Authorization Period Expiration: 08/30/2024  Plan of Care Expiration: 11/30/2023  Visit # / Visits authorized: 3/ 16    Time In: 1100  Time Out: 1145  Total Appointment Time (timed & untimed codes): 45 minutes    Precautions: Standard and Fall    Subjective     Date of onset: 08/01/23  Pleasant, energetic, with good efforts with all activity today  History of current condition - Rolando reports: fell at home 2 weeks ago with pain in knees and hips  the patient is anxious to regain function,confidence and prior mobility   Pts goals: regain walking with walker throughout residence    Medical History:   Past Medical History:   Diagnosis Date    GERD (gastroesophageal reflux disease)     Hypercholesterolemia        Objective     Range of motion:   Right Left    Hip flexion 115 117   Hip abduction WFL WFL   Hip adduction WFL WFL   Knee extension 5 6   Knee flexion 115 125   Ankle DF 4 5   Ankle PF 25 28     Manual muscle test:   Right  Left    Hip flexion  MMT strength: 3+/5  MMT strength: 3+/5   Hip abduction  MMT strength: 4-/5  MMT strength: 3+/5   Hip adduction  MMT strength: 3+/5  MMT strength: 4-/5   Knee extension  MMT strength: 3+/5  MMT strength: 3+/5   Knee flexion  MMT strength: 3+/5  MMT strength: 3+/5   Ankle dorsiflexion  MMT strength: 2/5  MMT strength: 3-/5   Ankle plantarflexion  MMT strength: 2/5  MMT strength: 2/5       Treatment     Total Treatment time (time-based codes) separate from Evaluation: 30 minutes     Rolando Garcia received the treatments listed below:      Therapeutic exercises to develop strength, endurance, ROM, and posture for 45 minutes  "including:  SCI FIT RECUMBENT SEATED STEPPER 5 MINUTES LEVEL 5 yes  CALF STRETCH/SLANT BOARD 30 SEC X 4  CALF RAISES 2 SETS X 10 REPS yes  PRONE KNEE FLEXION 2 SETS X 10 REPS  PRONE QUAD SETS 2 SETS X 10 REPS  RESISTIVE HEEL SLIDES  SEATED 2 SETS X 10 REPS yes  90/90 KNEE FLEXION 2 SETS X 10 REPS  STRAIGHT LEG RAISE 2 SETS X 10 REPS  INSIDE LEG RAISE standind 4 plane 2 sets x 10 reps yes  SUPINE HEEL SLIDE  SHORT ARC QUAD yes  MINI SQUAT 2 SETS X 10 REPS yes  LONG ARC QUAD with BALL SQUEEZE  2 SETS X 10 REPS  Pilates Ring x 50 rep yes  Hip abduction seated x red band 30 reps yes         Neuromuscular re-education activities to improve: Balance, Coordination, Proprioception, and Posture for 5 minutes. The following activities were included:      Therapeutic activities to improve functional performance for 0  minutes, including:      Gait training to improve functional mobility and safety for 0  minutes, including:    Patient Education and Home Exercises     Education provided: Initiate Teaching with Family    Written Home Exercises Provided: Patient instructed to cont prior HEP. Exercises were reviewed and Rolando was able to demonstrate them prior to the end of the session. Rolando demonstrated fair  understanding of the education provided.     See EMR under "Patient Instructions" for exercises provided during therapy sessions.    Assessment     Zaira is a 82 y.o. male referred to outpatient physical therapy with a medical diagnosis of Leg weakness. Pt presents to PT re strengthening rehab to improve activity tolerances.  This has good potential to reach stated goals and good potential to regain a prior level of Function. Patient will require  an essential,customized,progressive,skilled ,PT service to address identified impairments leading to current dysfunction .All deficits including strength,mobility,balance, ROM, pain and safety can be challenged with  this rehab program We expect patient to improve with exercise " tolerance as well as overall activity tolerances as appropriate for activities of daily living, work,leisure, recreation. Also expect  patient to have decreased pain and /or limitation during and after specific activity. Patient Education , Home Program component will also be included in overall rehab plan.   Pt prognosis is Good.   Pt will benefit from skilled outpatient Physical Therapy to address the deficits stated above and in the chart below, provide pt/family education, and to maximize pt's level of independence.     Plan of care discussed with patient: Yes  Pt's spiritual, cultural and educational needs considered and pt agreeable to plan of care and goals as stated below:       Goals:    Short Term Goals:  Patient will demonstrate independence with home exercise program to ensure carryover of treatment.  Patient will perform supine to/from sit with modified independence to improve independence and safety with bed mobility.  Patient will perform sit to/from stand with modified independence to improve independence and safety with transfers.  Patient will improve bilateral lower extremity strength to 4-/5 to improve independence and safety with bed mobility, transfers, and gait.  Patient will improve Tinetti Balance + Gait score to 18/28 to improve dynamic standing balance and lower fall risk.   Patient will ambulate 50 feet with a rolling walker with modified independence to improve independence and safety with gait.     Long Term Goals:  Patient will perform supine to/from sit with independence to improve independence and safety with bed mobility.  Patient will perform sit to/from stand with independence to improve independence and safety with transfers.  Patient will improve bilateral lower extremity strength to 4-/5 to improve independence and safety with bed mobility, transfers, and gait.  Patient will improve Tinetti Balance + Gait score to 20/28 to improve dynamic standing balance and lower fall risk.    Patient will ambulate 125 feet with a rolling walker with modified independence including up/down curbs and ramps to improve independence and safety with community ambulation.    Plan     Plan of care Certification: 9/6/2023 to 11/30/2023    Outpatient Physical Therapy 2 times weekly for 8 weeks to include the following interventions: Neuromuscular Re-ed, Therapeutic Activities, and Therapeutic Exercise.       Carlos Aparicio PT, DPT,OCS  9/13/23    _

## 2023-09-18 ENCOUNTER — CLINICAL SUPPORT (OUTPATIENT)
Dept: REHABILITATION | Facility: HOSPITAL | Age: 83
End: 2023-09-18
Payer: MEDICARE

## 2023-09-18 DIAGNOSIS — R27.0 ATAXIA: Primary | ICD-10-CM

## 2023-09-18 PROCEDURE — 97110 THERAPEUTIC EXERCISES: CPT

## 2023-09-18 NOTE — PROGRESS NOTES
[facility]  Physical Therapy Progress Note    Name: Zaira Garcia  Clinic Number: [id number]    Therapy Diagnosis:   No diagnosis found.      Physician: Portia Bateman MD    Physician Orders: PT Eval and Treat Weakness  Medical Diagnosis from Referral: R29.898  Evaluation Date: 9/6/2023  Authorization Period Expiration: 08/30/2024  Plan of Care Expiration: 11/30/2023  Visit # / Visits authorized: 4/ 16    Time In: 1015  Time Out: 1100  Total Appointment Time (timed & untimed codes): 45 minutes    Precautions: Standard and Fall    Subjective     Date of onset: 08/01/23  Pleasant, energetic, with good efforts with all activity today  History of current condition - Rolando reports: fell at home 2 weeks ago with pain in knees and hips  the patient is anxious to regain function,confidence and prior mobility   Pts goals: regain walking with walker throughout residence    Medical History:   Past Medical History:   Diagnosis Date    GERD (gastroesophageal reflux disease)     Hypercholesterolemia        Objective     Range of motion:   Right Left    Hip flexion 115 117   Hip abduction WFL WFL   Hip adduction WFL WFL   Knee extension 5 6   Knee flexion 115 125   Ankle DF 4 5   Ankle PF 25 28     Manual muscle test:   Right  Left    Hip flexion  MMT strength: 3+/5  MMT strength: 3+/5   Hip abduction  MMT strength: 4-/5  MMT strength: 3+/5   Hip adduction  MMT strength: 3+/5  MMT strength: 4-/5   Knee extension  MMT strength: 3+/5  MMT strength: 3+/5   Knee flexion  MMT strength: 3+/5  MMT strength: 3+/5   Ankle dorsiflexion  MMT strength: 2/5  MMT strength: 3-/5   Ankle plantarflexion  MMT strength: 2/5  MMT strength: 2/5       Treatment     Total Treatment time (time-based codes) separate from Evaluation: 30 minutes     Rolando Garcia received the treatments listed below:      Therapeutic exercises to develop strength, endurance, ROM, and posture for 45 minutes  "including:  SCI FIT RECUMBENT SEATED STEPPER 6 MINUTES LEVEL 5.5 yes  CALF STRETCH/SLANT BOARD 30 SEC X 4  CALF RAISES 2 SETS X 10 REPS yes  PRONE KNEE FLEXION 2 SETS X 10 REPS  PRONE QUAD SETS 2 SETS X 10 REPS  RESISTIVE HEEL SLIDES  SEATED 2 SETS X 10 REPS yes  90/90 KNEE FLEXION 2 SETS X 10 REPS yes  STRAIGHT LEG RAISE 2 SETS X 10 REPS  INSIDE LEG RAISE standind 4 plane 2 sets x 10 reps yes  SUPINE HEEL SLIDE  SHORT ARC QUAD yes  MINI SQUAT 2 SETS X 10 REPS yes  LONG ARC QUAD with BALL SQUEEZE  2 SETS X 10 REPS  Pilates Ring x 50 rep yes  Hip abduction seated x red band 30 reps yes         Neuromuscular re-education activities to improve: Balance, Coordination, Proprioception, and Posture for 5 minutes. The following activities were included:      Therapeutic activities to improve functional performance for 0  minutes, including:      Gait training to improve functional mobility and safety for 0  minutes, including:    Patient Education and Home Exercises     Education provided: Initiate Teaching with Family    Written Home Exercises Provided: Patient instructed to cont prior HEP. Exercises were reviewed and Rolando was able to demonstrate them prior to the end of the session. Rolando demonstrated fair  understanding of the education provided.     See EMR under "Patient Instructions" for exercises provided during therapy sessions.    Assessment     Zaira is a 82 y.o. male referred to outpatient physical therapy with a medical diagnosis of Leg weakness. Pt presents to PT re strengthening rehab to improve activity tolerances.  This has good potential to reach stated goals and good potential to regain a prior level of Function. Patient will require  an essential,customized,progressive,skilled ,PT service to address identified impairments leading to current dysfunction .All deficits including strength,mobility,balance, ROM, pain and safety can be challenged with  this rehab program We expect patient to improve with " exercise tolerance as well as overall activity tolerances as appropriate for activities of daily living, work,leisure, recreation. Also expect  patient to have decreased pain and /or limitation during and after specific activity. Patient Education , Home Program component will also be included in overall rehab plan.   Pt prognosis is Good.   Pt will benefit from skilled outpatient Physical Therapy to address the deficits stated above and in the chart below, provide pt/family education, and to maximize pt's level of independence.     Plan of care discussed with patient: Yes  Pt's spiritual, cultural and educational needs considered and pt agreeable to plan of care and goals as stated below:       Goals:    Short Term Goals:  Patient will demonstrate independence with home exercise program to ensure carryover of treatment.  Patient will perform supine to/from sit with modified independence to improve independence and safety with bed mobility.  Patient will perform sit to/from stand with modified independence to improve independence and safety with transfers.  Patient will improve bilateral lower extremity strength to 4-/5 to improve independence and safety with bed mobility, transfers, and gait.  Patient will improve Tinetti Balance + Gait score to 18/28 to improve dynamic standing balance and lower fall risk.   Patient will ambulate 50 feet with a rolling walker with modified independence to improve independence and safety with gait.     Long Term Goals:  Patient will perform supine to/from sit with independence to improve independence and safety with bed mobility.  Patient will perform sit to/from stand with independence to improve independence and safety with transfers.  Patient will improve bilateral lower extremity strength to 4-/5 to improve independence and safety with bed mobility, transfers, and gait.  Patient will improve Tinetti Balance + Gait score to 20/28 to improve dynamic standing balance and lower  fall risk.   Patient will ambulate 125 feet with a rolling walker with modified independence including up/down curbs and ramps to improve independence and safety with community ambulation.    Plan     Plan of care Certification: 9/6/2023 to 11/30/2023    Outpatient Physical Therapy 2 times weekly for 8 weeks to include the following interventions: Neuromuscular Re-ed, Therapeutic Activities, and Therapeutic Exercise.       Carlos Aparicio PT, DPT,OCS  9/18/23    _

## 2023-09-20 ENCOUNTER — CLINICAL SUPPORT (OUTPATIENT)
Dept: REHABILITATION | Facility: HOSPITAL | Age: 83
End: 2023-09-20
Payer: MEDICARE

## 2023-09-20 DIAGNOSIS — R27.0 ATAXIA: Primary | ICD-10-CM

## 2023-09-20 PROCEDURE — 97110 THERAPEUTIC EXERCISES: CPT

## 2023-09-20 NOTE — PROGRESS NOTES
[facility]  Physical Therapy Progress Note    Name: Zaira Garcia  Clinic Number: [id number]    Therapy Diagnosis:   No diagnosis found.      Physician: Portia Bateman MD    Physician Orders: PT Eval and Treat Weakness  Medical Diagnosis from Referral: R29.898  Evaluation Date: 9/6/2023  Authorization Period Expiration: 08/30/2024  Plan of Care Expiration: 11/30/2023  Visit # / Visits authorized: 6/ 16    Time In: 1530  Time Out: 1615  Total Appointment Time (timed & untimed codes): 45 minutes    Precautions: Standard and Fall    Subjective     Date of onset: 08/01/23  Pleasant, energetic, with good efforts with all activity today  History of current condition - Rolando reports: fell at home 2 weeks ago with pain in knees and hips  the patient is anxious to regain function,confidence and prior mobility   Pts goals: regain walking with walker throughout residence    Medical History:   Past Medical History:   Diagnosis Date    GERD (gastroesophageal reflux disease)     Hypercholesterolemia        Objective     Range of motion:   Right Left    Hip flexion 115 117   Hip abduction WFL WFL   Hip adduction WFL WFL   Knee extension 5 6   Knee flexion 115 125   Ankle DF 4 5   Ankle PF 25 28     Manual muscle test:   Right  Left    Hip flexion  MMT strength: 3+/5  MMT strength: 3+/5   Hip abduction  MMT strength: 4-/5  MMT strength: 3+/5   Hip adduction  MMT strength: 3+/5  MMT strength: 4-/5   Knee extension  MMT strength: 3+/5  MMT strength: 3+/5   Knee flexion  MMT strength: 3+/5  MMT strength: 3+/5   Ankle dorsiflexion  MMT strength: 2/5  MMT strength: 3-/5   Ankle plantarflexion  MMT strength: 2/5  MMT strength: 2/5       Treatment     Total Treatment time (time-based codes) separate from Evaluation: 45 minutes     Rolando Garcia received the treatments listed below:      Therapeutic exercises to develop strength, endurance, ROM, and posture for 40 minutes  "including:  SCI FIT RECUMBENT SEATED STEPPER 6 MINUTES LEVEL 5.5 yes  CALF STRETCH/SLANT BOARD 30 SEC X 4  CALF RAISES 2 SETS X 10 REPS yes  PRONE KNEE FLEXION 2 SETS X 10 REPSno  PRONE QUAD SETS 2 SETS X 10 REPS no  RESISTIVE HEEL SLIDES  SEATED 2 SETS X 10 REPS yes  90/90 KNEE FLEXION 2 SETS X 10 REPS yes  STRAIGHT LEG RAISE 2 SETS X 10 REPS  INSIDE LEG RAISE standind 4 plane 2 sets x 10 reps yes  SUPINE HEEL SLIDE  SHORT ARC QUAD yes  MINI SQUAT 2 SETS X 10 REPS yes  LONG ARC QUAD with BALL SQUEEZE  2 SETS X 10 REPS  Pilates Ring x 50 rep yes  Hip abduction seated x red band 30 reps yes         Neuromuscular re-education activities to improve: Balance, Coordination, Proprioception, and Posture for 5 minutes. The following activities were included:      Therapeutic activities to improve functional performance for 0  minutes, including:      Gait training to improve functional mobility and safety for 0  minutes, including:    Patient Education and Home Exercises     Education provided: Initiate Teaching with Family    Written Home Exercises Provided: Patient instructed to cont prior HEP. Exercises were reviewed and Rolando was able to demonstrate them prior to the end of the session. Rolando demonstrated fair  understanding of the education provided.     See EMR under "Patient Instructions" for exercises provided during therapy sessions.    Assessment     Zaira is a 82 y.o. male referred to outpatient physical therapy with a medical diagnosis of Leg weakness. Pt presents to PT re strengthening rehab to improve activity tolerances.  This has good potential to reach stated goals and good potential to regain a prior level of Function. Patient will require  an essential,customized,progressive,skilled ,PT service to address identified impairments leading to current dysfunction .All deficits including strength,mobility,balance, ROM, pain and safety can be challenged with  this rehab program We expect patient to improve with " exercise tolerance as well as overall activity tolerances as appropriate for activities of daily living, work,leisure, recreation. Also expect  patient to have decreased pain and /or limitation during and after specific activity. Patient Education , Home Program component will also be included in overall rehab plan.   Pt prognosis is Good.   Pt will benefit from skilled outpatient Physical Therapy to address the deficits stated above and in the chart below, provide pt/family education, and to maximize pt's level of independence.     Plan of care discussed with patient: Yes  Pt's spiritual, cultural and educational needs considered and pt agreeable to plan of care and goals as stated below:       Goals:    Short Term Goals:  Patient will demonstrate independence with home exercise program to ensure carryover of treatment.  Patient will perform supine to/from sit with modified independence to improve independence and safety with bed mobility.  Patient will perform sit to/from stand with modified independence to improve independence and safety with transfers.  Patient will improve bilateral lower extremity strength to 4-/5 to improve independence and safety with bed mobility, transfers, and gait.  Patient will improve Tinetti Balance + Gait score to 18/28 to improve dynamic standing balance and lower fall risk.   Patient will ambulate 50 feet with a rolling walker with modified independence to improve independence and safety with gait.     Long Term Goals:  Patient will perform supine to/from sit with independence to improve independence and safety with bed mobility.  Patient will perform sit to/from stand with independence to improve independence and safety with transfers.  Patient will improve bilateral lower extremity strength to 4-/5 to improve independence and safety with bed mobility, transfers, and gait.  Patient will improve Tinetti Balance + Gait score to 20/28 to improve dynamic standing balance and lower  fall risk.   Patient will ambulate 125 feet with a rolling walker with modified independence including up/down curbs and ramps to improve independence and safety with community ambulation.    Plan     Plan of care Certification: 9/6/2023 to 11/30/2023    Outpatient Physical Therapy 2 times weekly for 8 weeks to include the following interventions: Neuromuscular Re-ed, Therapeutic Activities, and Therapeutic Exercise.       Carlos Aparicio PT, DPT,OCS  9/20/23    _

## 2023-09-25 ENCOUNTER — CLINICAL SUPPORT (OUTPATIENT)
Dept: REHABILITATION | Facility: HOSPITAL | Age: 83
End: 2023-09-25
Payer: MEDICARE

## 2023-09-25 DIAGNOSIS — R27.0 ATAXIA: Primary | ICD-10-CM

## 2023-09-25 PROCEDURE — 97110 THERAPEUTIC EXERCISES: CPT

## 2023-09-25 NOTE — PROGRESS NOTES
[facility]  Physical Therapy Progress Note    Name: Zaira Garcia  Clinic Number: [id number]    Therapy Diagnosis:   No diagnosis found.      Physician: Portia Bateman MD    Physician Orders: PT Eval and Treat Weakness  Medical Diagnosis from Referral: R29.898  Evaluation Date: 9/6/2023  Authorization Period Expiration: 08/30/2024  Plan of Care Expiration: 11/30/2023  Visit # / Visits authorized: 7/ 16    Time In: 1015  Time Out: 1100  Total Appointment Time (timed & untimed codes): 45 minutes    Precautions: Standard and Fall    Subjective     Date of onset: 08/01/23  Pleasant, energetic, with good efforts with all activity today  History of current condition - Rolando reports: fell at home 2 weeks ago with pain in knees and hips  the patient is anxious to regain function,confidence and prior mobility   Pts goals: regain walking with walker throughout residence    Medical History:   Past Medical History:   Diagnosis Date    GERD (gastroesophageal reflux disease)     Hypercholesterolemia        Objective     Range of motion:   Right Left    Hip flexion 115 117   Hip abduction WFL WFL   Hip adduction WFL WFL   Knee extension 5 6   Knee flexion 115 125   Ankle DF 4 5   Ankle PF 25 28     Manual muscle test:   Right  Left    Hip flexion  MMT strength: 3+/5  MMT strength: 3+/5   Hip abduction  MMT strength: 4-/5  MMT strength: 3+/5   Hip adduction  MMT strength: 3+/5  MMT strength: 4-/5   Knee extension  MMT strength: 3+/5  MMT strength: 3+/5   Knee flexion  MMT strength: 3+/5  MMT strength: 3+/5   Ankle dorsiflexion  MMT strength: 2/5  MMT strength: 3-/5   Ankle plantarflexion  MMT strength: 2/5  MMT strength: 2/5       Treatment     Total Treatment time (time-based codes) separate from Evaluation: 45 minutes     Rolando Garcia received the treatments listed below:      Therapeutic exercises to develop strength, endurance, ROM, and posture for 40 minutes  "including:  SCI FIT RECUMBENT SEATED STEPPER 6 MINUTES LEVEL 5.5 yes  CALF STRETCH/SLANT BOARD 30 SEC X 4  CALF RAISES 2 SETS X 10 REPS yes  PRONE KNEE FLEXION 2 SETS X 10 REPSno  PRONE QUAD SETS 2 SETS X 10 REPS no  RESISTIVE HEEL SLIDES  SEATED 2 SETS X 10 REPS yes  90/90 KNEE FLEXION 2 SETS X 10 REPS yes  STRAIGHT LEG RAISE 2 SETS X 10 REPS  INSIDE LEG RAISE standind 4 plane 2 sets x 10 reps yes  SUPINE HEEL SLIDE  SHORT ARC QUAD yes  MINI SQUAT 2 SETS X 10 REPS yes  LONG ARC QUAD with BALL SQUEEZE  2 SETS X 10 REPS  Pilates Ring x 50 rep yes  Hip abduction seated x red band 30 reps yes         Neuromuscular re-education activities to improve: Balance, Coordination, Proprioception, and Posture for 5 minutes. The following activities were included:      Therapeutic activities to improve functional performance for 0  minutes, including:      Gait training to improve functional mobility and safety for 0  minutes, including:    Patient Education and Home Exercises     Education provided: Initiate Teaching with Family    Written Home Exercises Provided: Patient instructed to cont prior HEP. Exercises were reviewed and Rolando was able to demonstrate them prior to the end of the session. Rolando demonstrated fair  understanding of the education provided.     See EMR under "Patient Instructions" for exercises provided during therapy sessions.    Assessment     Zaira is a 82 y.o. male referred to outpatient physical therapy with a medical diagnosis of Leg weakness. Pt presents to PT re strengthening rehab to improve activity tolerances.  This has good potential to reach stated goals and good potential to regain a prior level of Function. Patient will require  an essential,customized,progressive,skilled ,PT service to address identified impairments leading to current dysfunction .All deficits including strength,mobility,balance, ROM, pain and safety can be challenged with  this rehab program We expect patient to improve with " exercise tolerance as well as overall activity tolerances as appropriate for activities of daily living, work,leisure, recreation. Also expect  patient to have decreased pain and /or limitation during and after specific activity. Patient Education , Home Program component will also be included in overall rehab plan.   Pt prognosis is Good.   Pt will benefit from skilled outpatient Physical Therapy to address the deficits stated above and in the chart below, provide pt/family education, and to maximize pt's level of independence.     Plan of care discussed with patient: Yes  Pt's spiritual, cultural and educational needs considered and pt agreeable to plan of care and goals as stated below:       Goals:    Short Term Goals:  Patient will demonstrate independence with home exercise program to ensure carryover of treatment.  Patient will perform supine to/from sit with modified independence to improve independence and safety with bed mobility.  Patient will perform sit to/from stand with modified independence to improve independence and safety with transfers.  Patient will improve bilateral lower extremity strength to 4-/5 to improve independence and safety with bed mobility, transfers, and gait.  Patient will improve Tinetti Balance + Gait score to 18/28 to improve dynamic standing balance and lower fall risk.   Patient will ambulate 50 feet with a rolling walker with modified independence to improve independence and safety with gait.     Long Term Goals:  Patient will perform supine to/from sit with independence to improve independence and safety with bed mobility.  Patient will perform sit to/from stand with independence to improve independence and safety with transfers.  Patient will improve bilateral lower extremity strength to 4-/5 to improve independence and safety with bed mobility, transfers, and gait.  Patient will improve Tinetti Balance + Gait score to 20/28 to improve dynamic standing balance and lower  fall risk.   Patient will ambulate 125 feet with a rolling walker with modified independence including up/down curbs and ramps to improve independence and safety with community ambulation.    Plan     Plan of care Certification: 9/6/2023 to 11/30/2023    Outpatient Physical Therapy 2 times weekly for 8 weeks to include the following interventions: Neuromuscular Re-ed, Therapeutic Activities, and Therapeutic Exercise.       Carlos Aparicio PT, DPT,OCS  9/25/23    _

## 2023-09-27 ENCOUNTER — CLINICAL SUPPORT (OUTPATIENT)
Dept: REHABILITATION | Facility: HOSPITAL | Age: 83
End: 2023-09-27
Payer: MEDICARE

## 2023-09-27 DIAGNOSIS — R29.898 LEG WEAKNESS, BILATERAL: Primary | ICD-10-CM

## 2023-09-27 PROCEDURE — 97110 THERAPEUTIC EXERCISES: CPT

## 2023-09-27 PROCEDURE — 97112 NEUROMUSCULAR REEDUCATION: CPT

## 2023-09-27 NOTE — PROGRESS NOTES
"[facility]  Physical Therapy Progress Note    Name: Zaira Garcia  Clinic Number: [id number]    Therapy Diagnosis:   No diagnosis found.      Physician: Portia Bateman MD    Physician Orders: PT Eval and Treat Weakness  Medical Diagnosis from Referral: R29.898  Evaluation Date: 9/6/2023  Authorization Period Expiration: 08/30/2024  Plan of Care Expiration: 11/30/2023  Visit # / Visits authorized: 8/ 16  PTA Visit # 0    Time In: 1015  Time Out: 1102  Total Appointment Time (timed & untimed codes): 47 minutes    Precautions: Standard and Fall    Subjective   Patient reports: He reports no significant on arrival and states "my legs just don't do right"   Date of onset: 08/01/23      History of current condition - See eval     Pts goals: regain walking with walker throughout residence    Medical History:   Past Medical History:   Diagnosis Date    GERD (gastroesophageal reflux disease)     Hypercholesterolemia        Objective     Rolando Garcia received the treatments listed below:      Therapeutic exercises to develop strength, endurance, ROM, and posture for 30 minutes including:  SCI FIT RECUMBENT SEATED STEPPER 6 MINUTES LEVEL 5.5   CALF STRETCH/SLANT BOARD 10 SEC X 4 each LE  CALF RAISES 2 SETS X 10 REPS   LAQ x 2 x 10 each LE  Hip abduction seated x red band 30 reps          Neuromuscular re-education activities to improve: Balance, Coordination, Proprioception, and Posture for 10 minutes. The following activities were included:  Standing marches x 2 x 10 each LE with verbal cues for motor control and to improve eccentric control on descent of LE's with CGA throughout .  Standing hip abd x 2 x 10 each LE with verbal cues for motor control and to avoid "slinging" his legs.   Rail squats with manual tactile cues for correct positioning, to avoid excessive bilateral knee hyperextension and verbal cues for eccentric control.     Therapeutic activities to " "improve functional performance for 4 minutes, including:  Patient performed WC to car transfers with verbal cues for correct set up and safety     Gait training to improve functional mobility and safety for 0  minutes, including:     Patient Education and Home Exercises     Education provided: HEP updated and provided today.     Written Home Exercises Provided: Patient given new HEP Exercises were reviewed and Rolando was able to demonstrate them prior to the end of the session. Rolando demonstrated good  understanding of the education provided.     See EMR under "Patient Instructions" for exercises provided during therapy sessions.    Assessment     Patient  with excellent effort today and able to add standing activities for carryover to his functional mobility. Patient verbalized at end of session that he felt like he got a lot out of his visit today  Pt prognosis is Good.   Pt will benefit from skilled outpatient Physical Therapy to address the deficits stated above and in the chart below, provide pt/family education, and to maximize pt's level of independence.     Plan of care discussed with patient: Yes  Pt's spiritual, cultural and educational needs considered and pt agreeable to plan of care and goals as stated below:       Goals:    Short Term Goals:  Patient will demonstrate independence with home exercise program to ensure carryover of treatment.  Patient will perform supine to/from sit with modified independence to improve independence and safety with bed mobility.  Patient will perform sit to/from stand with modified independence to improve independence and safety with transfers.  Patient will improve bilateral lower extremity strength to 4-/5 to improve independence and safety with bed mobility, transfers, and gait.  Patient will improve Tinetti Balance + Gait score to 18/28 to improve dynamic standing balance and lower fall risk.   Patient will ambulate 50 feet with a rolling walker with modified " independence to improve independence and safety with gait.     Long Term Goals:  Patient will perform supine to/from sit with independence to improve independence and safety with bed mobility.  Patient will perform sit to/from stand with independence to improve independence and safety with transfers.  Patient will improve bilateral lower extremity strength to 4-/5 to improve independence and safety with bed mobility, transfers, and gait.  Patient will improve Tinetti Balance + Gait score to 20/28 to improve dynamic standing balance and lower fall risk.   Patient will ambulate 125 feet with a rolling walker with modified independence including up/down curbs and ramps to improve independence and safety with community ambulation.    Plan     Plan Continue Plan of care with progression as appropriate    Plan of care Certification: 9/6/2023 to 11/30/2023    EMANUEL DOMINGUEZ, PT, ATP  9/25/23    _

## 2023-10-11 ENCOUNTER — CLINICAL SUPPORT (OUTPATIENT)
Dept: REHABILITATION | Facility: HOSPITAL | Age: 83
End: 2023-10-11
Payer: MEDICARE

## 2023-10-11 DIAGNOSIS — R27.0 ATAXIA: Primary | ICD-10-CM

## 2023-10-11 DIAGNOSIS — R29.898 LEG WEAKNESS, BILATERAL: ICD-10-CM

## 2023-10-11 PROCEDURE — 97110 THERAPEUTIC EXERCISES: CPT

## 2023-10-11 PROCEDURE — 97530 THERAPEUTIC ACTIVITIES: CPT

## 2023-10-11 PROCEDURE — 97112 NEUROMUSCULAR REEDUCATION: CPT

## 2023-10-11 NOTE — PROGRESS NOTES
"[facility]  Physical Therapy Treatment Note    Name: Zaira Garcia  Clinic Number: [id number]    Therapy Diagnosis:   Encounter Diagnoses   Name Primary?    Ataxia      Low back pain      Chronic inflammatory demyelinating polyradiculoneuropathy      Physician: Portia Bateman MD    Visit Date: 10/11/2023    Physician Orders: PT Eval and Treat Weakness  Medical Diagnosis from Referral: R29.898  Evaluation Date: 9/6/2023  Authorization Period Expiration: 08/30/2024  Plan of Care Expiration: 11/30/2023  Visit # / Visits authorized: 9/16  PTA Visit #: 0    Time In: 0850  Time Out: 0928  Total Billable Time: 38 minutes    Precautions: Standard and Fall  Functional Level Prior to Evaluation: Patient required assistance with functional mobility and ADLs.    Subjective     Pt reports: He is doing about the same. Patient reports he had a fall at home a couple weeks ago with no injuries reported.    He was compliant with home exercise program.  Response to previous treatment: good    Pain: 0/10  Location: N/A    Objective     Rolando received therapeutic exercises to develop strength, endurance, flexibility, posture, and core stabilization for 18 minutes including:    NuStep: 7 minutes  Standing heel raises in parallel bars with bilateral upper extremity support: x 20 reps  Long arc quads: 3 lbs; x 20 reps each  Seated alternating marching: 3 lbs; x 20 reps each  Seated hip adduction squeezes: x 20 reps with 3 second holds  Seated hip abduction: green theraband; x 20 reps     Rolando participated in neuromuscular re-education activities to improve: Balance, Coordination, Proprioception, and Posture for 15 minutes. The following activities were included:    Standing marches: x 20 reps each with verbal cues for motor and eccentric control of lower extremities; contact guard assist  Standing hip abd x 2 x 10 each LE with verbal cues for motor control and to avoid "slinging" his legs. "   Rail squats with manual tactile cues for correct positioning, to avoid excessive bilateral knee hyperextension and verbal cues for eccentric control.   Seated cone taps: x 10 reps each (manual assist to keep cone upright)    Rolando participated in dynamic functional therapeutic activities to improve functional performance for 5 minutes, including:    Stand pivot transfer from wheelchair to NuStep (moderate assist; unsafe technique reaching for NuStep rather than using rolling walker to turn; heavy verbal cueing)    Rolando participated in gait training to improve functional mobility and safety for 0 minutes, including:    (not performed)     Home Exercises Provided and Patient Education Provided     Education provided: Patient educated on the importance of completing skilled physical therapy treatment and home exercise program as prescribed to maximize functional gains.     Written Home Exercises Provided: Patient instructed to cont prior HEP. Exercises were reviewed and Rolando was able to demonstrate them prior to the end of the session.  Rolando demonstrated fair  understanding of the education provided.     See EMR under Patient Instructions for exercises provided prior visit.    Assessment     Patient with good effort throughout treatment. Patient with greater control with his left than right lower extremity. Patient with very unsafe transfer technique demonstrated when transferring from the wheelchair to NuStep with a preference to reach for the NuStep rather than use the rolling walker to complete the turn prior to sitting. Patient with significant difficulty performing seated cone taps with bilateral lower extremities with inability to keep cone standing upright on any attempt. Patient able to increase weight/resistance with long arc quads and seated hip abduction this treatment. Physical Therapist will continue to progress therapeutic exercise, neuromuscular re-education, therapeutic activities, and gait  training as able with manual therapy and modalities utilized as needed.     Rolando isprogressing well towards his goals.   Pt prognosis is Fair.     Pt will continue to benefit from skilled outpatient physical therapy to address the deficits listed in the problem list box on initial evaluation, provide pt/family education, and to maximize pt's level of independence in the home and community environment.     Pt's spiritual, cultural, and educational needs considered and pt agreeable to plan of care and goals.     Anticipated barriers to physical therapy: compliance with home exercise program; chronicity and progressive nature of patient's condition    Goals:    Short Term Goals:  Patient will demonstrate independence with home exercise program to ensure carryover of treatment.  Patient will perform supine to/from sit with modified independence to improve independence and safety with bed mobility.  Patient will perform sit to/from stand with modified independence to improve independence and safety with transfers.  Patient will improve bilateral lower extremity strength to 4-/5 to improve independence and safety with bed mobility, transfers, and gait.  Patient will improve Tinetti Balance + Gait score to 18/28 to improve dynamic standing balance and lower fall risk.   Patient will ambulate 50 feet with a rolling walker with modified independence to improve independence and safety with gait.     Long Term Goals:  Patient will perform supine to/from sit with independence to improve independence and safety with bed mobility.  Patient will perform sit to/from stand with independence to improve independence and safety with transfers.  Patient will improve bilateral lower extremity strength to 4-/5 to improve independence and safety with bed mobility, transfers, and gait.  Patient will improve Tinetti Balance + Gait score to 20/28 to improve dynamic standing balance and lower fall risk.   Patient will ambulate 125 feet with a  rolling walker with modified independence including up/down curbs and ramps to improve independence and safety with community ambulation.    Plan     Patient will continue to benefit from skilled physical therapy treatment as prescribed working towards goals listed above to maximize functional potential.       Yahir Pacheco, PT, DPT  10/11/2023

## 2023-10-13 ENCOUNTER — CLINICAL SUPPORT (OUTPATIENT)
Dept: REHABILITATION | Facility: HOSPITAL | Age: 83
End: 2023-10-13
Payer: MEDICARE

## 2023-10-13 DIAGNOSIS — R27.0 ATAXIA: Primary | ICD-10-CM

## 2023-10-13 DIAGNOSIS — R29.898 LEG WEAKNESS, BILATERAL: ICD-10-CM

## 2023-10-13 PROCEDURE — 97110 THERAPEUTIC EXERCISES: CPT

## 2023-10-13 PROCEDURE — 97112 NEUROMUSCULAR REEDUCATION: CPT

## 2023-10-13 NOTE — PROGRESS NOTES
"[facility]  Physical Therapy Treatment Note    Name: Zaira Garcia  Clinic Number: [id number]    Therapy Diagnosis:   Encounter Diagnoses   Name Primary?    Ataxia      Low back pain      Chronic inflammatory demyelinating polyradiculoneuropathy      Physician: Portia Bateman MD    Visit Date: 10/13/2023    Physician Orders: PT Eval and Treat Weakness  Medical Diagnosis from Referral: R29.898  Evaluation Date: 9/6/2023  Authorization Period Expiration: 08/30/2024  Plan of Care Expiration: 11/30/2023  Visit # / Visits authorized: 10/16  PTA Visit #: 0    Time In: 1400  Time Out: 1445  Total Billable Time: 45 minutes    Precautions: Standard and Fall  Functional Level Prior to Evaluation: Patient required assistance with functional mobility and ADLs.    Subjective     Pt reports: He is doing about the same.    He was compliant with home exercise program.  Response to previous treatment: good    Pain: 0/10  Location: N/A    Objective     Rolando received therapeutic exercises to develop strength, endurance, flexibility, posture, and core stabilization for 25 minutes including:    NuStep: 7 minutes  Standing heel raises in parallel bars with bilateral upper extremity support: x 20 reps  Long arc quads: 3 lbs; x 20 reps each  Seated alternating marching: 3 lbs; x 20 reps each  Seated heel/toe raises: x 20 reps  Seated hip adduction squeezes: x 20 reps with 3 second holds  Seated hip abduction: green theraband; x 20 reps     Rolando participated in neuromuscular re-education activities to improve: Balance, Coordination, Proprioception, and Posture for 15 minutes. The following activities were included:    Standing marches: x 20 reps each with verbal cues for motor and eccentric control of lower extremities; contact guard assist  Standing hip abd x 2 x 10 each LE with verbal cues for motor control and to avoid "slinging" his legs; minimal assist  Rail squats with manual tactile " cues for correct positioning, to avoid excessive bilateral knee hyperextension and verbal cues for eccentric control.     Seated cone taps: x 10 reps each (manual assist to keep cone upright) (not performed)     Rolando participated in dynamic functional therapeutic activities to improve functional performance for 5 minutes, including:    Wheelchair to NuStep transfer via side stepping (moderate assist; heavy verbal cueing)    Rolando participated in gait training to improve functional mobility and safety for 0 minutes, including:    (not performed)     Home Exercises Provided and Patient Education Provided     Education provided: Patient educated on the importance of completing skilled physical therapy treatment and home exercise program as prescribed to maximize functional gains.     Written Home Exercises Provided: Patient instructed to cont prior HEP. Exercises were reviewed and Rolando was able to demonstrate them prior to the end of the session.  Rolando demonstrated fair  understanding of the education provided.     See EMR under Patient Instructions for exercises provided prior visit.    Assessment     Patient with good effort throughout treatment. Patient with increased difficulty controlling his left lower extremity this treatment, most notably with standing left marching. Physical Therapist offered to work on gait training today following exercises; however, patient reported he was too tired. Physical Therapist will continue to progress therapeutic exercise, neuromuscular re-education, therapeutic activities, and gait training as able with manual therapy and modalities utilized as needed.    Rolando isprogressing well towards his goals.   Pt prognosis is Fair.     Pt will continue to benefit from skilled outpatient physical therapy to address the deficits listed in the problem list box on initial evaluation, provide pt/family education, and to maximize pt's level of independence in the home and community  environment.     Pt's spiritual, cultural, and educational needs considered and pt agreeable to plan of care and goals.     Anticipated barriers to physical therapy: compliance with home exercise program; chronicity and progressive nature of patient's condition    Goals:    Short Term Goals:  Patient will demonstrate independence with home exercise program to ensure carryover of treatment.  Patient will perform supine to/from sit with modified independence to improve independence and safety with bed mobility.  Patient will perform sit to/from stand with modified independence to improve independence and safety with transfers.  Patient will improve bilateral lower extremity strength to 4-/5 to improve independence and safety with bed mobility, transfers, and gait.  Patient will improve Tinetti Balance + Gait score to 18/28 to improve dynamic standing balance and lower fall risk.   Patient will ambulate 50 feet with a rolling walker with modified independence to improve independence and safety with gait.     Long Term Goals:  Patient will perform supine to/from sit with independence to improve independence and safety with bed mobility.  Patient will perform sit to/from stand with independence to improve independence and safety with transfers.  Patient will improve bilateral lower extremity strength to 4-/5 to improve independence and safety with bed mobility, transfers, and gait.  Patient will improve Tinetti Balance + Gait score to 20/28 to improve dynamic standing balance and lower fall risk.   Patient will ambulate 125 feet with a rolling walker with modified independence including up/down curbs and ramps to improve independence and safety with community ambulation.    Plan     Patient will continue to benefit from skilled physical therapy treatment as prescribed working towards goals listed above to maximize functional potential.       Yahir Pacheco, PT, DPT  10/13/2023

## 2023-10-16 ENCOUNTER — CLINICAL SUPPORT (OUTPATIENT)
Dept: REHABILITATION | Facility: HOSPITAL | Age: 83
End: 2023-10-16
Payer: MEDICARE

## 2023-10-16 DIAGNOSIS — R27.0 ATAXIA: Primary | ICD-10-CM

## 2023-10-16 DIAGNOSIS — R29.898 LEG WEAKNESS, BILATERAL: ICD-10-CM

## 2023-10-16 PROCEDURE — 97112 NEUROMUSCULAR REEDUCATION: CPT | Mod: CQ

## 2023-10-16 PROCEDURE — 97110 THERAPEUTIC EXERCISES: CPT | Mod: CQ

## 2023-10-16 NOTE — PROGRESS NOTES
"[facility]  Physical Therapy Treatment Note    Name: Zaira Garcia  Clinic Number: [id number]    Therapy Diagnosis:   Encounter Diagnoses   Name Primary?    Ataxia      Low back pain      Chronic inflammatory demyelinating polyradiculoneuropathy      Physician: Portia Bateman MD    Visit Date: 10/16/2023    Physician Orders: PT Eval and Treat Weakness  Medical Diagnosis from Referral: R29.898  Evaluation Date: 9/6/2023  Authorization Period Expiration: 08/30/2024  Plan of Care Expiration: 11/30/2023  Visit # / Visits authorized: 11/16  PTA Visit #: 1    Time In: 0844  Time Out: 0930  Total Billable Time: 46 minutes    Precautions: Standard and Fall  Functional Level Prior to Evaluation: Patient required assistance with functional mobility and ADLs.    Subjective     Pt reports: Patient reported having some pain in his lower legs this morning.     He was compliant with home exercise program.  Response to previous treatment: good    Pain: 5/10  Location: N/A    Objective     Rolando received therapeutic exercises to develop strength, endurance, flexibility, posture, and core stabilization for 25 minutes including:  NuStep: 7 minutes  Standing heel raises in parallel bars with bilateral upper extremity support: x 20 reps  Long arc quads: 3 lbs; x 20 reps each  Seated alternating marching: 3 lbs; x 20 reps each  Seated heel/toe raises: x 20 reps  Seated hip adduction squeezes: x 20 reps with 3 second holds  Seated hip abduction: green theraband; x 20 reps     Rolando participated in neuromuscular re-education activities to improve: Balance, Coordination, Proprioception, and Posture for 15 minutes. The following activities were included:  Standing marches: x 20 reps each with verbal cues for motor and eccentric control of lower extremities; contact guard assist  Standing hip abd x 2 x 10 each LE with verbal cues for motor control and to avoid "slinging" his legs; minimal " assist  Rail squats with manual tactile cues for correct positioning, to avoid excessive bilateral knee hyperextension and verbal cues for eccentric control.     Seated cone taps: x 10 reps each (manual assist to keep cone upright) (not performed)     Rolando participated in dynamic functional therapeutic activities to improve functional performance for 6 minutes, including:  Wheelchair to NuStep transfer via side stepping (moderate assist; heavy verbal cueing)    Rolando participated in gait training to improve functional mobility and safety for 0 minutes, including:  (not performed)       Home Exercises Provided and Patient Education Provided     Education provided: Patient educated on the importance of completing skilled physical therapy treatment and home exercise program as prescribed to maximize functional gains.     Written Home Exercises Provided: Patient instructed to cont prior HEP. Exercises were reviewed and Rolando was able to demonstrate them prior to the end of the session.  Rolando demonstrated fair  understanding of the education provided.     See EMR under Patient Instructions for exercises provided prior visit.    Assessment     Patient with good effort throughout treatment despite having some pains. Patient still has difficulty with controlling his right lower extremity and reports having pain when standing up today. Physical Therapist Assistant will continue to progress therapeutic exercise, neuromuscular re-education, therapeutic activities, and gait training as able with manual therapy and modalities utilized as needed.     Rolando isprogressing well towards his goals.   Pt prognosis is Fair.     Pt will continue to benefit from skilled outpatient physical therapy to address the deficits listed in the problem list box on initial evaluation, provide pt/family education, and to maximize pt's level of independence in the home and community environment.     Pt's spiritual, cultural, and educational needs  considered and pt agreeable to plan of care and goals.     Anticipated barriers to physical therapy: compliance with home exercise program; chronicity and progressive nature of patient's condition    Goals:    Short Term Goals:  Patient will demonstrate independence with home exercise program to ensure carryover of treatment.  Patient will perform supine to/from sit with modified independence to improve independence and safety with bed mobility.  Patient will perform sit to/from stand with modified independence to improve independence and safety with transfers.  Patient will improve bilateral lower extremity strength to 4-/5 to improve independence and safety with bed mobility, transfers, and gait.  Patient will improve Tinetti Balance + Gait score to 18/28 to improve dynamic standing balance and lower fall risk.   Patient will ambulate 50 feet with a rolling walker with modified independence to improve independence and safety with gait.     Long Term Goals:  Patient will perform supine to/from sit with independence to improve independence and safety with bed mobility.  Patient will perform sit to/from stand with independence to improve independence and safety with transfers.  Patient will improve bilateral lower extremity strength to 4-/5 to improve independence and safety with bed mobility, transfers, and gait.  Patient will improve Tinetti Balance + Gait score to 20/28 to improve dynamic standing balance and lower fall risk.   Patient will ambulate 125 feet with a rolling walker with modified independence including up/down curbs and ramps to improve independence and safety with community ambulation.    Plan     Patient will continue to benefit from skilled physical therapy treatment as prescribed working towards goals listed above to maximize functional potential.       MARIE Mooney  10/16/2023

## 2023-10-18 ENCOUNTER — CLINICAL SUPPORT (OUTPATIENT)
Dept: REHABILITATION | Facility: HOSPITAL | Age: 83
End: 2023-10-18
Payer: MEDICARE

## 2023-10-18 DIAGNOSIS — R29.898 LEG WEAKNESS, BILATERAL: ICD-10-CM

## 2023-10-18 DIAGNOSIS — R27.0 ATAXIA: Primary | ICD-10-CM

## 2023-10-18 PROCEDURE — 97110 THERAPEUTIC EXERCISES: CPT | Mod: CQ

## 2023-10-18 PROCEDURE — 97112 NEUROMUSCULAR REEDUCATION: CPT | Mod: CQ

## 2023-10-18 PROCEDURE — 97530 THERAPEUTIC ACTIVITIES: CPT | Mod: CQ

## 2023-10-18 NOTE — PROGRESS NOTES
[facility]  Physical Therapy Treatment Note    Name: Zaira Garcia  Clinic Number: [id number]    Therapy Diagnosis:   Encounter Diagnoses   Name Primary?    Ataxia      Low back pain      Chronic inflammatory demyelinating polyradiculoneuropathy      Physician: Portia Bateman MD    Visit Date: 10/18/2023    Physician Orders: PT Eval and Treat Weakness  Medical Diagnosis from Referral: R29.898  Evaluation Date: 9/6/2023  Authorization Period Expiration: 08/30/2024  Plan of Care Expiration: 11/30/2023  Visit # / Visits authorized: 12/16  PTA Visit #: 2    Time In: 1528  Time Out: 1608  Total Billable Time: 40 minutes    Precautions: Standard and Fall  Functional Level Prior to Evaluation: Patient required assistance with functional mobility and ADLs.    Subjective     Pt reports: Patient with no complaint of pain, but did have complaint of fatigue due to recent treatment today that took 6 hours.     He was compliant with home exercise program.  Response to previous treatment: good    Pain: 0/10  Location: N/A    Objective     Rolando received therapeutic exercises to develop strength, endurance, flexibility, posture, and core stabilization for 20 minutes including:  NuStep: 7 minutes  Standing heel raises in parallel bars with bilateral upper extremity support: x 20 reps  Long arc quads: 3 lbs; x 20 reps each  Seated alternating marching: 3 lbs; x 20 reps each  Seated heel/toe raises: x 20 reps  Seated hip adduction squeezes: x 20 reps with 3 second holds  Seated hip abduction: green theraband; x 20 reps     Rolando participated in neuromuscular re-education activities to improve: Balance, Coordination, Proprioception, and Posture for 15 minutes. The following activities were included:  Standing marches: x 20 reps each with verbal cues for motor and eccentric control of lower extremities; contact guard assist  Standing hip abd x 2 x 10 each LE with verbal cues for motor  "control and to avoid "slinging" his legs; minimal assist  Rail squats with manual tactile cues for correct positioning, to avoid excessive bilateral knee hyperextension and verbal cues for eccentric control.   Seated cone taps: x 10 reps each (manual assist to keep cone upright) (not performed)     Rolando participated in dynamic functional therapeutic activities to improve functional performance for 5 minutes, including:  Wheelchair to NuStep transfer via side stepping (moderate assist; heavy verbal cueing)    Rolando participated in gait training to improve functional mobility and safety for 0 minutes, including:  (not performed)       Home Exercises Provided and Patient Education Provided     Education provided: Patient educated on the importance of completing skilled physical therapy treatment and home exercise program as prescribed to maximize functional gains.     Written Home Exercises Provided: Patient instructed to cont prior HEP. Exercises were reviewed and Rolando was able to demonstrate them prior to the end of the session.  Rolando demonstrated fair  understanding of the education provided.     See EMR under Patient Instructions for exercises provided prior visit.    Assessment     Patient with good effort throughout treatment.. Patient still has difficulty with controlling his right lower extremity and reports having pain when standing up today. Physical Therapist Assistant will continue to progress therapeutic exercise, neuromuscular re-education, therapeutic activities, and gait training as able with manual therapy and modalities utilized as needed.     Rolando isprogressing well towards his goals.   Pt prognosis is Fair.     Pt will continue to benefit from skilled outpatient physical therapy to address the deficits listed in the problem list box on initial evaluation, provide pt/family education, and to maximize pt's level of independence in the home and community environment.     Pt's spiritual, cultural, " and educational needs considered and pt agreeable to plan of care and goals.     Anticipated barriers to physical therapy: compliance with home exercise program; chronicity and progressive nature of patient's condition    Goals:    Short Term Goals:  Patient will demonstrate independence with home exercise program to ensure carryover of treatment.  Patient will perform supine to/from sit with modified independence to improve independence and safety with bed mobility.  Patient will perform sit to/from stand with modified independence to improve independence and safety with transfers.  Patient will improve bilateral lower extremity strength to 4-/5 to improve independence and safety with bed mobility, transfers, and gait.  Patient will improve Tinetti Balance + Gait score to 18/28 to improve dynamic standing balance and lower fall risk.   Patient will ambulate 50 feet with a rolling walker with modified independence to improve independence and safety with gait.     Long Term Goals:  Patient will perform supine to/from sit with independence to improve independence and safety with bed mobility.  Patient will perform sit to/from stand with independence to improve independence and safety with transfers.  Patient will improve bilateral lower extremity strength to 4-/5 to improve independence and safety with bed mobility, transfers, and gait.  Patient will improve Tinetti Balance + Gait score to 20/28 to improve dynamic standing balance and lower fall risk.   Patient will ambulate 125 feet with a rolling walker with modified independence including up/down curbs and ramps to improve independence and safety with community ambulation.    Plan     Patient will continue to benefit from skilled physical therapy treatment as prescribed working towards goals listed above to maximize functional potential.       MARIE Mooney  10/18/2023

## 2023-10-23 ENCOUNTER — CLINICAL SUPPORT (OUTPATIENT)
Dept: REHABILITATION | Facility: HOSPITAL | Age: 83
End: 2023-10-23
Payer: MEDICARE

## 2023-10-23 DIAGNOSIS — R27.0 ATAXIA: Primary | ICD-10-CM

## 2023-10-23 DIAGNOSIS — R29.898 LEG WEAKNESS, BILATERAL: ICD-10-CM

## 2023-10-23 PROCEDURE — 97112 NEUROMUSCULAR REEDUCATION: CPT | Mod: CQ

## 2023-10-23 PROCEDURE — 97110 THERAPEUTIC EXERCISES: CPT | Mod: CQ

## 2023-10-23 NOTE — PROGRESS NOTES
"[facility]  Physical Therapy Treatment Note    Name: Zaira Garcia  Clinic Number: [id number]    Therapy Diagnosis:   Encounter Diagnoses   Name Primary?    Ataxia      Low back pain      Chronic inflammatory demyelinating polyradiculoneuropathy      Physician: Portia Bateman MD    Visit Date: 10/23/2023    Physician Orders: PT Eval and Treat Weakness  Medical Diagnosis from Referral: R29.898  Evaluation Date: 9/6/2023  Authorization Period Expiration: 08/30/2024  Plan of Care Expiration: 11/30/2023  Visit # / Visits authorized: 13/16  PTA Visit #: 3    Time In: 0842  Time Out: 0924  Total Billable Time: 42 minutes    Precautions: Standard and Fall  Functional Level Prior to Evaluation: Patient required assistance with functional mobility and ADLs.    Subjective     Pt reports: he had a rough weekend with leg pain and weakness; after originally stating he did not fall over the weekend, patient stated "I did fall a couple times. Didn't hurt anything but my pride."    He was compliant with home exercise program.  Response to previous treatment: good    Pain: 0/10  Location: N/A    Objective     Rolando received therapeutic exercises to develop strength, endurance, flexibility, posture, and core stabilization for 23 minutes including:    NuStep: 7 minutes  Standing heel raises in parallel bars with bilateral upper extremity support: x 20 reps  Long arc quads: 3 lbs; x 20 reps each  Seated alternating marching: 3 lbs; x 20 reps each  Seated heel/toe raises: x 20 reps  Seated hip adduction squeezes: x 20 reps with 3 second hold  Seated hip abduction: green theraband; x 20 reps     Rolando participated in neuromuscular re-education activities to improve: Balance, Coordination, Proprioception, and Posture for 14 minutes. The following activities were included:    Standing marches: x 20 reps each with verbal cues for motor and eccentric control of lower extremities; contact " "guard assist  Standing hip abd x 2 x 10 each LE with verbal cues for motor control and to avoid "slinging" his legs; minimal assist  Rail squats with manual tactile cues for correct positioning, to avoid excessive bilateral knee hyperextension and verbal cues for eccentric control     Seated cone taps: x 10 reps each (manual assist to keep cone upright) (not performed)     Rolando participated in dynamic functional therapeutic activities to improve functional performance for 5 minutes, including:    Wheelchair to NuStep transfer via side stepping (moderate assist; heavy verbal cueing; difficulty picking up and pivoting on right lower extremity)    Rolando participated in gait training to improve functional mobility and safety for 0 minutes, including:    (not performed)     Home Exercises Provided and Patient Education Provided     Education provided: Patient educated on the importance of completing skilled physical therapy treatment and home exercise program as prescribed to maximize functional gains.     Written Home Exercises Provided: Patient instructed to cont prior HEP. Exercises were reviewed and Rolando was able to demonstrate them prior to the end of the session.  Rolando demonstrated fair  understanding of the education provided.     See EMR under Patient Instructions for exercises provided prior visit.    Assessment     Patient with good effort throughout treatment. Patient reported pain in bilateral lower extremities prior to and throughout exercises this visit, but able to perform all exercises regardless. Patient is still very easily fatigued and has poor control of bilateral lower extremities. Physical Therapist Assistant will continue to progress therapeutic exercise, neuromuscular re-education, therapeutic activities, and gait training as able with manual therapy and modalities utilized as needed.    Rolando isprogressing well towards his goals.   Pt prognosis is Fair.     Pt will continue to benefit from " skilled outpatient physical therapy to address the deficits listed in the problem list box on initial evaluation, provide pt/family education, and to maximize pt's level of independence in the home and community environment.     Pt's spiritual, cultural, and educational needs considered and pt agreeable to plan of care and goals.     Anticipated barriers to physical therapy: compliance with home exercise program; chronicity and progressive nature of patient's condition    Goals:    Short Term Goals:  Patient will demonstrate independence with home exercise program to ensure carryover of treatment.  Patient will perform supine to/from sit with modified independence to improve independence and safety with bed mobility.  Patient will perform sit to/from stand with modified independence to improve independence and safety with transfers.  Patient will improve bilateral lower extremity strength to 4-/5 to improve independence and safety with bed mobility, transfers, and gait.  Patient will improve Tinetti Balance + Gait score to 18/28 to improve dynamic standing balance and lower fall risk.   Patient will ambulate 50 feet with a rolling walker with modified independence to improve independence and safety with gait.     Long Term Goals:  Patient will perform supine to/from sit with independence to improve independence and safety with bed mobility.  Patient will perform sit to/from stand with independence to improve independence and safety with transfers.  Patient will improve bilateral lower extremity strength to 4-/5 to improve independence and safety with bed mobility, transfers, and gait.  Patient will improve Tinetti Balance + Gait score to 20/28 to improve dynamic standing balance and lower fall risk.   Patient will ambulate 125 feet with a rolling walker with modified independence including up/down curbs and ramps to improve independence and safety with community ambulation.    Plan     Patient will continue to  benefit from skilled physical therapy treatment as prescribed working towards goals listed above to maximize functional potential.       MARIE Jacobo  10/23/2023

## 2023-10-25 ENCOUNTER — CLINICAL SUPPORT (OUTPATIENT)
Dept: REHABILITATION | Facility: HOSPITAL | Age: 83
End: 2023-10-25
Payer: MEDICARE

## 2023-10-25 DIAGNOSIS — R27.0 ATAXIA: Primary | ICD-10-CM

## 2023-10-25 DIAGNOSIS — R29.898 LEG WEAKNESS, BILATERAL: ICD-10-CM

## 2023-10-25 PROCEDURE — 97110 THERAPEUTIC EXERCISES: CPT | Mod: CQ

## 2023-10-25 PROCEDURE — 97112 NEUROMUSCULAR REEDUCATION: CPT | Mod: CQ

## 2023-10-25 PROCEDURE — 97530 THERAPEUTIC ACTIVITIES: CPT | Mod: CQ

## 2023-10-25 NOTE — PROGRESS NOTES
"[facility]  Physical Therapy Treatment Note    Name: Zaira Garcia  Clinic Number: [id number]    Therapy Diagnosis:   Encounter Diagnoses   Name Primary?    Ataxia      Low back pain      Chronic inflammatory demyelinating polyradiculoneuropathy      Physician: Portia Bateman MD    Visit Date: 10/25/2023    Physician Orders: PT Eval and Treat Weakness  Medical Diagnosis from Referral: R29.898  Evaluation Date: 9/6/2023  Authorization Period Expiration: 08/30/2024  Plan of Care Expiration: 11/30/2023  Visit # / Visits authorized: 14/16  PTA Visit #: 4    Time In: 1359  Time Out: 1446  Total Billable Time: 47 minutes    Precautions: Standard and Fall  Functional Level Prior to Evaluation: Patient required assistance with functional mobility and ADLs.    Subjective     Pt reports: he is doing good with no complaints    He was compliant with home exercise program.  Response to previous treatment: good    Pain: 0/10  Location: N/A    Objective     Rolando received therapeutic exercises to develop strength, endurance, flexibility, posture, and core stabilization for 25 minutes including:    NuStep: 10 minutes  Standing heel raises in parallel bars with bilateral upper extremity support: x 20 reps  Long arc quads: 3 lbs; x 20 reps each  Seated alternating marching: 3 lbs; x 20 reps each  Seated hip adduction squeezes: x 20 reps with 3 second hold  Seated hip abduction: green theraband; x 20 reps     Rolando participated in neuromuscular re-education activities to improve: Balance, Coordination, Proprioception, and Posture for 12 minutes. The following activities were included:    Standing heel/toe raises: x 20 reps  Standing marches: x 20 reps each with verbal cues for motor and eccentric control of lower extremities; contact guard assist  Standing hip abd x 2 x 10 each LE with verbal cues for motor control and to avoid "slinging" his legs; contact guard assist    Rail squats " with manual tactile cues for correct positioning, to avoid excessive bilateral knee hyperextension and verbal cues for eccentric control (not performed)  Seated cone taps: x 10 reps each (manual assist to keep cone upright) (not performed)     Rolando participated in dynamic functional therapeutic activities to improve functional performance for 10 minutes, including:    Toilet transfer with rolling walker and moderate assist due to difficulty picking up right lower extremity  Wheelchair to NuStep transfer via side stepping (moderate assist; heavy verbal cueing; difficulty picking up and pivoting on right lower extremity)    Rolando participated in gait training to improve functional mobility and safety for 0 minutes, including:    (not performed)     Home Exercises Provided and Patient Education Provided     Education provided: Patient educated on the importance of completing skilled physical therapy treatment and home exercise program as prescribed to maximize functional gains.     Written Home Exercises Provided: Patient instructed to cont prior HEP. Exercises were reviewed and Rolando was able to demonstrate them prior to the end of the session.  Rolando demonstrated fair  understanding of the education provided.     See EMR under Patient Instructions for exercises provided prior visit.    Assessment     Patient with good effort throughout treatment. Patient able to perform all exercises this visit. Patient did require assistance to use restroom at the end of treatment. Patient is still very easily fatigued and has poor control of bilateral lower extremities. Physical Therapist Assistant will continue to progress therapeutic exercise, neuromuscular re-education, therapeutic activities, and gait training as able with manual therapy and modalities utilized as needed.    Rolando isprogressing well towards his goals.   Pt prognosis is Fair.     Pt will continue to benefit from skilled outpatient physical therapy to address  the deficits listed in the problem list box on initial evaluation, provide pt/family education, and to maximize pt's level of independence in the home and community environment.     Pt's spiritual, cultural, and educational needs considered and pt agreeable to plan of care and goals.     Anticipated barriers to physical therapy: compliance with home exercise program; chronicity and progressive nature of patient's condition    Goals:    Short Term Goals:  Patient will demonstrate independence with home exercise program to ensure carryover of treatment.  Patient will perform supine to/from sit with modified independence to improve independence and safety with bed mobility.  Patient will perform sit to/from stand with modified independence to improve independence and safety with transfers.  Patient will improve bilateral lower extremity strength to 4-/5 to improve independence and safety with bed mobility, transfers, and gait.  Patient will improve Tinetti Balance + Gait score to 18/28 to improve dynamic standing balance and lower fall risk.   Patient will ambulate 50 feet with a rolling walker with modified independence to improve independence and safety with gait.     Long Term Goals:  Patient will perform supine to/from sit with independence to improve independence and safety with bed mobility.  Patient will perform sit to/from stand with independence to improve independence and safety with transfers.  Patient will improve bilateral lower extremity strength to 4-/5 to improve independence and safety with bed mobility, transfers, and gait.  Patient will improve Tinetti Balance + Gait score to 20/28 to improve dynamic standing balance and lower fall risk.   Patient will ambulate 125 feet with a rolling walker with modified independence including up/down curbs and ramps to improve independence and safety with community ambulation.    Plan     Patient will continue to benefit from skilled physical therapy treatment as  prescribed working towards goals listed above to maximize functional potential.       MARIE Jacobo  10/25/2023

## 2023-11-01 ENCOUNTER — CLINICAL SUPPORT (OUTPATIENT)
Dept: REHABILITATION | Facility: HOSPITAL | Age: 83
End: 2023-11-01
Payer: MEDICARE

## 2023-11-01 DIAGNOSIS — R29.898 LEG WEAKNESS, BILATERAL: ICD-10-CM

## 2023-11-01 DIAGNOSIS — R27.0 ATAXIA: Primary | ICD-10-CM

## 2023-11-01 PROCEDURE — 97112 NEUROMUSCULAR REEDUCATION: CPT | Mod: CQ

## 2023-11-01 PROCEDURE — 97110 THERAPEUTIC EXERCISES: CPT | Mod: CQ

## 2023-11-01 PROCEDURE — 97116 GAIT TRAINING THERAPY: CPT | Mod: CQ

## 2023-11-01 NOTE — PROGRESS NOTES
"[facility]  Physical Therapy Treatment Note    Name: Zaira Garcia  Clinic Number: [id number]    Therapy Diagnosis:   Encounter Diagnoses   Name Primary?    Ataxia      Low back pain      Chronic inflammatory demyelinating polyradiculoneuropathy      Physician: Portia Bateman MD    Visit Date: 2023    Physician Orders: PT Eval and Treat Weakness  Medical Diagnosis from Referral: R29.898  Evaluation Date: 2023  Authorization Period Expiration: 2024  Plan of Care Expiration: 2023  Visit # / Visits authorized: 15/17  PTA Visit #: 5    Time In: 0847  Time Out: 928  Total Billable Time: 41 minutes    Precautions: Standard and Fall  Functional Level Prior to Evaluation: Patient required assistance with functional mobility and ADLs.    Subjective     Pt reports: no new complaints; just complaints of weakness    He was compliant with home exercise program.  Response to previous treatment: good    Pain: 0/10  Location: N/A    Objective     Rolando received therapeutic exercises to develop strength, endurance, flexibility, posture, and core stabilization for 18 minutes including:    NuStep: 7 minutes  Long arc quads: 4 lbs; x 20 reps each  Seated alternating marchin lbs; x 20 reps each  Seated hip adduction squeezes: x 20 reps with 3 second hold  Seated hip abduction: green theraband; x 20 reps     Rolando participated in neuromuscular re-education activities to improve: Balance, Coordination, Proprioception, and Posture for 13 minutes. The following activities were included:    Standing heel raises in parallel bars with bilateral upper extremity support: x 20 reps  Standing marches: x 20 reps each with verbal cues for motor and eccentric control of lower extremities; contact guard assist  Standing hip abd x 2 x 10 each LE with verbal cues for motor control and to avoid "slinging" his legs; contact guard assist  Rail squats with manual tactile cues for " correct positioning, to avoid excessive bilateral knee hyperextension and verbal cues for eccentric control    Seated cone taps: x 10 reps each (manual assist to keep cone upright) (not performed)     Rolando participated in dynamic functional therapeutic activities to improve functional performance for 5 minutes, including:    Wheelchair to NuStep transfer via side stepping (moderate assist; heavy verbal cueing; difficulty picking up and pivoting on right lower extremity)    Toilet transfer with rolling walker and moderate assist due to difficulty picking up right lower extremity (not performed)    Rolando participated in gait training to improve functional mobility and safety for 5 minutes, including:    Gait training in parallel bars with focus on bilateral heel strike, keeping toes pointed straight, equal step lengths, and safe turns    Home Exercises Provided and Patient Education Provided     Education provided: Patient educated on the importance of completing skilled physical therapy treatment and home exercise program as prescribed to maximize functional gains.     Written Home Exercises Provided: Patient instructed to cont prior HEP. Exercises were reviewed and Rolando was able to demonstrate them prior to the end of the session.  Rolando demonstrated fair  understanding of the education provided.     See EMR under Patient Instructions for exercises provided prior visit.    Assessment     Patient with good effort throughout treatment. Patient able to perform all exercises this visit with only complaint fatigue. Patient is still very easily fatigued and has poor control of bilateral lower extremities. Patient with good tolerance of added weight to Long arc quads and seated hip flexion exercises. Patient performed gait in parallel bars, requiring verbal cues for heel strike, step length, and keeping bilateral lower extremities from externally rotating. Physical Therapist Assistant will continue to progress  therapeutic exercise, neuromuscular re-education, therapeutic activities, and gait training as able with manual therapy and modalities utilized as needed.    Rolando isprogressing well towards his goals.   Pt prognosis is Fair.     Pt will continue to benefit from skilled outpatient physical therapy to address the deficits listed in the problem list box on initial evaluation, provide pt/family education, and to maximize pt's level of independence in the home and community environment.     Pt's spiritual, cultural, and educational needs considered and pt agreeable to plan of care and goals.     Anticipated barriers to physical therapy: compliance with home exercise program; chronicity and progressive nature of patient's condition    Goals:    Short Term Goals:  Patient will demonstrate independence with home exercise program to ensure carryover of treatment.  Patient will perform supine to/from sit with modified independence to improve independence and safety with bed mobility.  Patient will perform sit to/from stand with modified independence to improve independence and safety with transfers.  Patient will improve bilateral lower extremity strength to 4-/5 to improve independence and safety with bed mobility, transfers, and gait.  Patient will improve Tinetti Balance + Gait score to 18/28 to improve dynamic standing balance and lower fall risk.   Patient will ambulate 50 feet with a rolling walker with modified independence to improve independence and safety with gait.     Long Term Goals:  Patient will perform supine to/from sit with independence to improve independence and safety with bed mobility.  Patient will perform sit to/from stand with independence to improve independence and safety with transfers.  Patient will improve bilateral lower extremity strength to 4-/5 to improve independence and safety with bed mobility, transfers, and gait.  Patient will improve Tinetti Balance + Gait score to 20/28 to improve  dynamic standing balance and lower fall risk.   Patient will ambulate 125 feet with a rolling walker with modified independence including up/down curbs and ramps to improve independence and safety with community ambulation.    Plan     Patient will continue to benefit from skilled physical therapy treatment as prescribed working towards goals listed above to maximize functional potential.       MARIE Jacobo  11/1/2023

## 2023-11-03 ENCOUNTER — CLINICAL SUPPORT (OUTPATIENT)
Dept: REHABILITATION | Facility: HOSPITAL | Age: 83
End: 2023-11-03
Payer: MEDICARE

## 2023-11-03 DIAGNOSIS — R27.0 ATAXIA: Primary | ICD-10-CM

## 2023-11-03 DIAGNOSIS — R29.898 LEG WEAKNESS, BILATERAL: ICD-10-CM

## 2023-11-03 PROCEDURE — 97110 THERAPEUTIC EXERCISES: CPT

## 2023-11-03 PROCEDURE — 97112 NEUROMUSCULAR REEDUCATION: CPT

## 2023-11-03 NOTE — PROGRESS NOTES
[facility]  Physical Therapy Treatment Note    Name: Zaira Garcia  Clinic Number: [id number]    Therapy Diagnosis:   Encounter Diagnoses   Name Primary?    Ataxia      Low back pain      Chronic inflammatory demyelinating polyradiculoneuropathy      Physician: Portia Bateman MD    Visit Date: 11/3/2023    Physician Orders: PT Eval and Treat Weakness  Medical Diagnosis from Referral: R29.898  Evaluation Date: 2023  Authorization Period Expiration: 2024  Plan of Care Expiration: 2023  Visit # / Visits authorized:   PTA Visit #: 0    Time In: 1232  Time Out: 1320  Total Billable Time: 48 minutes    Precautions: Standard and Fall  Functional Level Prior to Evaluation: Patient required assistance with functional mobility and ADLs.    Subjective     Pt reports: His primary means of mobility inside his home is his manual wheelchair. Patient reports he has a power scooter that he uses outside his home, but he would be interested in obtaining a custom power wheelchair if his insurance would cover it.     He was compliant with home exercise program.  Response to previous treatment: good    Pain: 0/10  Location: N/A    Objective     Rolando received therapeutic exercises to develop strength, endurance, flexibility, posture, and core stabilization for 24 minutes including:    Long arc quads: 4 lbs; x 20 reps each  Seated alternating marchin lbs; x 20 reps each  Seated hip adduction squeezes: x 20 reps with 3 second hold  Seated hip abduction: green theraband; x 20 reps     NuStep: 7 minutes (not performed)     Rolando participated in neuromuscular re-education activities to improve: Balance, Coordination, Proprioception, and Posture for 24 minutes. The following activities were included:    Standing heel raises in parallel bars with bilateral upper extremity support: x 20 reps  Standing marching: x 20 reps each with verbal cues for motor and eccentric  "control of lower extremities; contact guard assist  Standing hip abduction: x 2 x 10 reps each lower extremity with verbal cues for motor control and to avoid "slinging" his legs; contact guard assist  Rail squats with tactile cues for correct positioning to avoid excessive bilateral knee hyperextension and verbal cues for eccentric control: x 20 reps    Seated cone taps: x 10 reps each (manual assist to keep cone upright) (not performed)     Rolando participated in dynamic functional therapeutic activities to improve functional performance for 0 minutes, including:    Wheelchair to NuStep transfer via side stepping (moderate assist; heavy verbal cueing; difficulty picking up and pivoting on right lower extremity) (not performed)   Toilet transfer with rolling walker and moderate assist due to difficulty picking up right lower extremity (not performed)    Rolando participated in gait training to improve functional mobility and safety for 0 minutes, including:    Gait training in parallel bars with focus on bilateral heel strike, keeping toes pointed straight, equal step lengths, and safe turns (not performed)     Home Exercises Provided and Patient Education Provided     Education provided: Patient educated on the importance of completing skilled physical therapy treatment and home exercise program as prescribed to maximize functional gains.     Written Home Exercises Provided: Patient instructed to cont prior HEP. Exercises were reviewed and Rolando was able to demonstrate them prior to the end of the session.  Rolando demonstrated fair  understanding of the education provided.     See EMR under Patient Instructions for exercises provided prior visit.    Assessment     Patient with good effort throughout treatment. Patient able to perform all exercises this visit with only complaints of fatigue. Patient is still easily fatigued and has poor control of bilateral lower extremities. Physical Therapist spoke extensively with " patient about the benefits of a custom power wheelchair. Patient reports he plans to talk about this with Dr. Dominguez. Patient with 1 physical therapy treatment session remaining. Physical Therapist will continue to progress therapeutic exercise, neuromuscular re-education, therapeutic activities, and gait training as able with manual therapy and modalities utilized as needed.    Rolando isprogressing well towards his goals.   Pt prognosis is Fair.     Pt will continue to benefit from skilled outpatient physical therapy to address the deficits listed in the problem list box on initial evaluation, provide pt/family education, and to maximize pt's level of independence in the home and community environment.     Pt's spiritual, cultural, and educational needs considered and pt agreeable to plan of care and goals.     Anticipated barriers to physical therapy: compliance with home exercise program; chronicity and progressive nature of patient's condition    Goals:    Short Term Goals:  Patient will demonstrate independence with home exercise program to ensure carryover of treatment.  Patient will perform supine to/from sit with modified independence to improve independence and safety with bed mobility.  Patient will perform sit to/from stand with modified independence to improve independence and safety with transfers.  Patient will improve bilateral lower extremity strength to 4-/5 to improve independence and safety with bed mobility, transfers, and gait.  Patient will improve Tinetti Balance + Gait score to 18/28 to improve dynamic standing balance and lower fall risk.   Patient will ambulate 50 feet with a rolling walker with modified independence to improve independence and safety with gait.     Long Term Goals:  Patient will perform supine to/from sit with independence to improve independence and safety with bed mobility.  Patient will perform sit to/from stand with independence to improve independence and safety with  transfers.  Patient will improve bilateral lower extremity strength to 4-/5 to improve independence and safety with bed mobility, transfers, and gait.  Patient will improve Tinetti Balance + Gait score to 20/28 to improve dynamic standing balance and lower fall risk.   Patient will ambulate 125 feet with a rolling walker with modified independence including up/down curbs and ramps to improve independence and safety with community ambulation.    Plan     Patient will continue to benefit from skilled physical therapy treatment as prescribed working towards goals listed above to maximize functional potential.       Yahir Pacheco, PT, DPT  11/3/2023

## 2023-11-07 ENCOUNTER — CLINICAL SUPPORT (OUTPATIENT)
Dept: REHABILITATION | Facility: HOSPITAL | Age: 83
End: 2023-11-07
Payer: MEDICARE

## 2023-11-07 DIAGNOSIS — R29.898 LEG WEAKNESS, BILATERAL: ICD-10-CM

## 2023-11-07 DIAGNOSIS — R27.0 ATAXIA: Primary | ICD-10-CM

## 2023-11-07 PROCEDURE — 97110 THERAPEUTIC EXERCISES: CPT

## 2023-11-07 PROCEDURE — 97112 NEUROMUSCULAR REEDUCATION: CPT

## 2023-11-07 NOTE — PLAN OF CARE
"[facility]  Physical Therapy Discharge Summary    Name: Zaira Garcia  Clinic Number: [id number]    Therapy Diagnosis:   Encounter Diagnoses   Name Primary?    Ataxia      Low back pain      Chronic inflammatory demyelinating polyradiculoneuropathy      Physician: Portia Bateman MD    Visit Date: 2023    Physician Orders: PT Eval and Treat Weakness  Medical Diagnosis from Referral: R29.898  Evaluation Date: 2023  Authorization Period Expiration: 2024  Plan of Care Expiration: 2023  Visit # / Visits authorized:   PTA Visit #: 0    Time In: 1320  Time Out: 1400  Total Billable Time: 40 minutes    Precautions: Standard and Fall  Functional Level Prior to Evaluation: Patient required assistance with functional mobility and ADLs.    Subjective     Pt reports: He has not had a chance to talk with Dr. Dominguez about his custom power wheelchair evaluation just yet.     He was compliant with home exercise program.  Response to previous treatment: good    Pain: 0/10  Location: N/A    Objective     Rolando received therapeutic exercises to develop strength, endurance, flexibility, posture, and core stabilization for 25 minutes including:    Long arc quads: 4 lbs; x 20 reps each  Seated alternating marchin lbs; x 20 reps each  Seated heel/toe raises: x 20 reps each  Seated hip adduction squeezes: x 20 reps with 3 second holds  Seated hip abduction: green theraband; x 20 reps     Rolando participated in neuromuscular re-education activities to improve: Balance, Coordination, Proprioception, and Posture for 15 minutes. The following activities were included:    Standing marching: x 20 reps each with verbal cues for motor and eccentric control of lower extremities; contact guard assist  Standing hip abduction: x 20 reps each lower extremity with verbal cues for motor control to avoid "slinging" his legs; contact guard assist  Rail squats with tactile cues " for correct positioning to avoid excessive bilateral knee hyperextension and verbal cues for eccentric control: x 20 reps    Home Exercises Provided and Patient Education Provided     Education provided: Patient educated on the importance of continuing completion of his home exercise program for lower extremity strength maintenance/progression.     Written Home Exercises Provided: Patient instructed to cont prior HEP. Exercises were reviewed and Rolando was able to demonstrate them prior to the end of the session.  Rolando demonstrated fair  understanding of the education provided.     See EMR under Patient Instructions for exercises provided prior visit.    Assessment     Patient with good effort throughout treatment. Patient has reached the maximum benefit from skilled physical therapy treatment at the present time and is ready for discharge with a home exercise program. Patient encouraged to talk with Dr. Dominguez about receiving an order for a custom power wheelchair evaluation.     Pt's spiritual, cultural, and educational needs considered and pt agreeable to plan of care and goals.     Anticipated barriers to physical therapy: compliance with home exercise program; chronicity and progressive nature of patient's condition    Goals:    Short Term Goals:  Patient will demonstrate independence with home exercise program to ensure carryover of treatment. [Met]  Patient will perform supine to/from sit with modified independence to improve independence and safety with bed mobility. [Not met]  Patient will perform sit to/from stand with modified independence to improve independence and safety with transfers. [Met]  Patient will improve bilateral lower extremity strength to 4-/5 to improve independence and safety with bed mobility, transfers, and gait. [Not met]  Patient will improve Tinetti Balance + Gait score to 18/28 to improve dynamic standing balance and lower fall risk. [Not met]  Patient will ambulate 50 feet with a  rolling walker with modified independence to improve independence and safety with gait. [Not met]     Long Term Goals:  Patient will perform supine to/from sit with independence to improve independence and safety with bed mobility. [Not met]  Patient will perform sit to/from stand with independence to improve independence and safety with transfers. [Not met]  Patient will improve bilateral lower extremity strength to 4-/5 to improve independence and safety with bed mobility, transfers, and gait. [Not met]  Patient will improve Tinetti Balance + Gait score to 20/28 to improve dynamic standing balance and lower fall risk. [Not met]  Patient will ambulate 125 feet with a rolling walker with modified independence including up/down curbs and ramps to improve independence and safety with community ambulation. [Not met]    Discharge reason: Patient has reached the maximum rehab potential for the present time.    Plan     This patient is discharged from Physical Therapy.      Yahir Pacheco, PT, DPT  11/7/2023

## 2023-11-07 NOTE — PROGRESS NOTES
[facility]  Physical Therapy Discharge Summary    Name: Zaira Garcia  Clinic Number: [id number]    Therapy Diagnosis:   Encounter Diagnoses   Name Primary?    Ataxia      Low back pain      Chronic inflammatory demyelinating polyradiculoneuropathy      Physician: Portia Bateman MD    Visit Date: 2023    Physician Orders: PT Eval and Treat Weakness  Medical Diagnosis from Referral: R29.898  Evaluation Date: 2023  Authorization Period Expiration: 2024  Plan of Care Expiration: 2023  Visit # / Visits authorized:   PTA Visit #: 0    Time In: 1320  Time Out: ***  Total Billable Time: *** minutes    Precautions: Standard and Fall  Functional Level Prior to Evaluation: Patient required assistance with functional mobility and ADLs.    Subjective     Pt reports: He has not had a chance to talk with Dr. Dominguez about his custom power wheelchair evaluation just yet.     He was compliant with home exercise program.  Response to previous treatment: good    Pain: 0/10  Location: N/A    Objective     Rolando received therapeutic exercises to develop strength, endurance, flexibility, posture, and core stabilization for *** minutes including:    Long arc quads: 4 lbs; x 20 reps each  Seated alternating marchin lbs; x 20 reps each  Seated heel/toe raises: x 20 reps each  Seated hip adduction squeezes: x 20 reps with 3 second hold  Seated hip abduction: green theraband; x 20 reps     NuStep: 7 minutes (not performed)     Rolando participated in neuromuscular re-education activities to improve: Balance, Coordination, Proprioception, and Posture for ** minutes. The following activities were included:    Standing heel raises in parallel bars with bilateral upper extremity support: x 20 reps  Standing marching: x 20 reps each with verbal cues for motor and eccentric control of lower extremities; contact guard assist  Standing hip abduction: x 2 x 10 reps each  "lower extremity with verbal cues for motor control and to avoid "slinging" his legs; contact guard assist  Rail squats with tactile cues for correct positioning to avoid excessive bilateral knee hyperextension and verbal cues for eccentric control: x 20 reps    Seated cone taps: x 10 reps each (manual assist to keep cone upright) (not performed)     Rolando participated in dynamic functional therapeutic activities to improve functional performance for *** minutes, including:    Wheelchair to NuStep transfer via side stepping (moderate assist; heavy verbal cueing; difficulty picking up and pivoting on right lower extremity) (not performed)   Toilet transfer with rolling walker and moderate assist due to difficulty picking up right lower extremity (not performed)    Rolando participated in gait training to improve functional mobility and safety for *** minutes, including:    Gait training in parallel bars with focus on bilateral heel strike, keeping toes pointed straight, equal step lengths, and safe turns (not performed)     Home Exercises Provided and Patient Education Provided     Education provided: Patient educated on the importance of completing skilled physical therapy treatment and home exercise program as prescribed to maximize functional gains.     Written Home Exercises Provided: Patient instructed to cont prior HEP. Exercises were reviewed and Rolando was able to demonstrate them prior to the end of the session.  Rolando demonstrated fair  understanding of the education provided.     See EMR under Patient Instructions for exercises provided prior visit.    Assessment     Patient with good effort throughout treatment. Patient able to perform all exercises this visit with only complaints of fatigue. Patient is still easily fatigued and has poor control of bilateral lower extremities. Physical Therapist spoke extensively with patient about the benefits of a custom power wheelchair. Patient reports he plans to talk " about this with Dr. Dominguez. Patient with 1 physical therapy treatment session remaining. Physical Therapist will continue to progress therapeutic exercise, neuromuscular re-education, therapeutic activities, and gait training as able with manual therapy and modalities utilized as needed. ***    Rolando isprogressing well towards his goals.   Pt prognosis is Fair.     Pt will continue to benefit from skilled outpatient physical therapy to address the deficits listed in the problem list box on initial evaluation, provide pt/family education, and to maximize pt's level of independence in the home and community environment.     Pt's spiritual, cultural, and educational needs considered and pt agreeable to plan of care and goals.     Anticipated barriers to physical therapy: compliance with home exercise program; chronicity and progressive nature of patient's condition    Goals:    Short Term Goals:  Patient will demonstrate independence with home exercise program to ensure carryover of treatment.  Patient will perform supine to/from sit with modified independence to improve independence and safety with bed mobility.  Patient will perform sit to/from stand with modified independence to improve independence and safety with transfers.  Patient will improve bilateral lower extremity strength to 4-/5 to improve independence and safety with bed mobility, transfers, and gait.  Patient will improve Tinetti Balance + Gait score to 18/28 to improve dynamic standing balance and lower fall risk.   Patient will ambulate 50 feet with a rolling walker with modified independence to improve independence and safety with gait.     Long Term Goals:  Patient will perform supine to/from sit with independence to improve independence and safety with bed mobility.  Patient will perform sit to/from stand with independence to improve independence and safety with transfers.  Patient will improve bilateral lower extremity strength to 4-/5 to improve  independence and safety with bed mobility, transfers, and gait.  Patient will improve Tinetti Balance + Gait score to 20/28 to improve dynamic standing balance and lower fall risk.   Patient will ambulate 125 feet with a rolling walker with modified independence including up/down curbs and ramps to improve independence and safety with community ambulation.    Plan     Patient will continue to benefit from skilled physical therapy treatment as prescribed working towards goals listed above to maximize functional potential.       Yahir Pacheco, PT, DPT  11/7/2023